# Patient Record
Sex: FEMALE | Race: BLACK OR AFRICAN AMERICAN | NOT HISPANIC OR LATINO | Employment: UNEMPLOYED | URBAN - METROPOLITAN AREA
[De-identification: names, ages, dates, MRNs, and addresses within clinical notes are randomized per-mention and may not be internally consistent; named-entity substitution may affect disease eponyms.]

---

## 2024-05-29 ENCOUNTER — HOSPITAL ENCOUNTER (OUTPATIENT)
Facility: HOSPITAL | Age: 64
Discharge: HOME | DRG: 419 | End: 2024-06-02
Attending: INTERNAL MEDICINE | Admitting: INTERNAL MEDICINE
Payer: COMMERCIAL

## 2024-05-29 DIAGNOSIS — K80.20 CALCULUS OF GALLBLADDER WITHOUT CHOLECYSTITIS WITHOUT OBSTRUCTION: ICD-10-CM

## 2024-05-29 DIAGNOSIS — R10.13 EPIGASTRIC PAIN: ICD-10-CM

## 2024-05-29 DIAGNOSIS — R74.01 TRANSAMINITIS: Primary | ICD-10-CM

## 2024-05-29 LAB
ALBUMIN SERPL-MCNC: 4.2 G/DL (ref 3.4–4.8)
ALBUMIN/GLOB SERPL: 1.1 RATIO (ref 1.1–2)
ALP SERPL-CCNC: 320 UNIT/L (ref 40–150)
ALT SERPL-CCNC: 1038 UNIT/L (ref 0–55)
ANION GAP SERPL CALC-SCNC: 13 MEQ/L
APAP SERPL-MCNC: <3 UG/ML (ref 10–30)
APTT PPP: 25.2 SECONDS (ref 23.2–33.7)
AST SERPL-CCNC: 1008 UNIT/L (ref 5–34)
BASOPHILS # BLD AUTO: 0.02 X10(3)/MCL
BASOPHILS NFR BLD AUTO: 0.2 %
BILIRUB SERPL-MCNC: 1.4 MG/DL
BUN SERPL-MCNC: 12.6 MG/DL (ref 9.8–20.1)
CALCIUM SERPL-MCNC: 9.9 MG/DL (ref 8.4–10.2)
CHLORIDE SERPL-SCNC: 105 MMOL/L (ref 98–107)
CO2 SERPL-SCNC: 20 MMOL/L (ref 23–31)
CREAT SERPL-MCNC: 0.78 MG/DL (ref 0.55–1.02)
CREAT/UREA NIT SERPL: 16
EOSINOPHIL # BLD AUTO: 0 X10(3)/MCL (ref 0–0.9)
EOSINOPHIL NFR BLD AUTO: 0 %
ERYTHROCYTE [DISTWIDTH] IN BLOOD BY AUTOMATED COUNT: 11.6 % (ref 11.5–17)
GFR SERPLBLD CREATININE-BSD FMLA CKD-EPI: >60 ML/MIN/1.73/M2
GLOBULIN SER-MCNC: 3.7 GM/DL (ref 2.4–3.5)
GLUCOSE SERPL-MCNC: 140 MG/DL (ref 82–115)
HCT VFR BLD AUTO: 39.7 % (ref 37–47)
HGB BLD-MCNC: 13.4 G/DL (ref 12–16)
IMM GRANULOCYTES # BLD AUTO: 0.04 X10(3)/MCL (ref 0–0.04)
IMM GRANULOCYTES NFR BLD AUTO: 0.4 %
INR PPP: 1
LIPASE SERPL-CCNC: 19 U/L
LYMPHOCYTES # BLD AUTO: 0.47 X10(3)/MCL (ref 0.6–4.6)
LYMPHOCYTES NFR BLD AUTO: 4.3 %
MCH RBC QN AUTO: 32.4 PG (ref 27–31)
MCHC RBC AUTO-ENTMCNC: 33.8 G/DL (ref 33–36)
MCV RBC AUTO: 95.9 FL (ref 80–94)
MONOCYTES # BLD AUTO: 0.38 X10(3)/MCL (ref 0.1–1.3)
MONOCYTES NFR BLD AUTO: 3.4 %
NEUTROPHILS # BLD AUTO: 10.14 X10(3)/MCL (ref 2.1–9.2)
NEUTROPHILS NFR BLD AUTO: 91.7 %
NRBC BLD AUTO-RTO: 0 %
OHS QRS DURATION: 84 MS
OHS QTC CALCULATION: 427 MS
PLATELET # BLD AUTO: 138 X10(3)/MCL (ref 130–400)
PLATELETS.RETICULATED NFR BLD AUTO: 7.4 % (ref 0.9–11.2)
PMV BLD AUTO: 12.3 FL (ref 7.4–10.4)
POTASSIUM SERPL-SCNC: 3.7 MMOL/L (ref 3.5–5.1)
PROT SERPL-MCNC: 7.9 GM/DL (ref 5.8–7.6)
PROTHROMBIN TIME: 12.8 SECONDS (ref 12.5–14.5)
RBC # BLD AUTO: 4.14 X10(6)/MCL (ref 4.2–5.4)
SODIUM SERPL-SCNC: 138 MMOL/L (ref 136–145)
WBC # SPEC AUTO: 11.05 X10(3)/MCL (ref 4.5–11.5)

## 2024-05-29 PROCEDURE — 25000003 PHARM REV CODE 250: Performed by: NURSE PRACTITIONER

## 2024-05-29 PROCEDURE — 96375 TX/PRO/DX INJ NEW DRUG ADDON: CPT

## 2024-05-29 PROCEDURE — 99285 EMERGENCY DEPT VISIT HI MDM: CPT | Mod: 25

## 2024-05-29 PROCEDURE — 96376 TX/PRO/DX INJ SAME DRUG ADON: CPT

## 2024-05-29 PROCEDURE — 93005 ELECTROCARDIOGRAM TRACING: CPT

## 2024-05-29 PROCEDURE — G0378 HOSPITAL OBSERVATION PER HR: HCPCS

## 2024-05-29 PROCEDURE — 63600175 PHARM REV CODE 636 W HCPCS

## 2024-05-29 PROCEDURE — 63600175 PHARM REV CODE 636 W HCPCS: Performed by: INTERNAL MEDICINE

## 2024-05-29 PROCEDURE — 85610 PROTHROMBIN TIME: CPT

## 2024-05-29 PROCEDURE — 96361 HYDRATE IV INFUSION ADD-ON: CPT

## 2024-05-29 PROCEDURE — 25000003 PHARM REV CODE 250

## 2024-05-29 PROCEDURE — 80143 DRUG ASSAY ACETAMINOPHEN: CPT

## 2024-05-29 PROCEDURE — 80053 COMPREHEN METABOLIC PANEL: CPT

## 2024-05-29 PROCEDURE — 96374 THER/PROPH/DIAG INJ IV PUSH: CPT

## 2024-05-29 PROCEDURE — 11000001 HC ACUTE MED/SURG PRIVATE ROOM

## 2024-05-29 PROCEDURE — 80074 ACUTE HEPATITIS PANEL: CPT

## 2024-05-29 PROCEDURE — 83690 ASSAY OF LIPASE: CPT

## 2024-05-29 PROCEDURE — 25500020 PHARM REV CODE 255

## 2024-05-29 PROCEDURE — 85730 THROMBOPLASTIN TIME PARTIAL: CPT

## 2024-05-29 PROCEDURE — 85025 COMPLETE CBC W/AUTO DIFF WBC: CPT

## 2024-05-29 RX ORDER — FAMOTIDINE 10 MG/ML
20 INJECTION INTRAVENOUS
Status: COMPLETED | OUTPATIENT
Start: 2024-05-29 | End: 2024-05-29

## 2024-05-29 RX ORDER — ONDANSETRON 4 MG/1
4 TABLET, ORALLY DISINTEGRATING ORAL
Status: COMPLETED | OUTPATIENT
Start: 2024-05-29 | End: 2024-05-29

## 2024-05-29 RX ORDER — POLYETHYLENE GLYCOL 3350 17 G/17G
17 POWDER, FOR SOLUTION ORAL
COMMUNITY

## 2024-05-29 RX ORDER — MORPHINE SULFATE 4 MG/ML
2 INJECTION, SOLUTION INTRAMUSCULAR; INTRAVENOUS EVERY 6 HOURS PRN
Status: DISCONTINUED | OUTPATIENT
Start: 2024-05-29 | End: 2024-06-02 | Stop reason: HOSPADM

## 2024-05-29 RX ORDER — HYDRALAZINE HYDROCHLORIDE 20 MG/ML
10 INJECTION INTRAMUSCULAR; INTRAVENOUS EVERY 6 HOURS PRN
Status: DISCONTINUED | OUTPATIENT
Start: 2024-05-29 | End: 2024-06-02 | Stop reason: HOSPADM

## 2024-05-29 RX ORDER — MORPHINE SULFATE 4 MG/ML
4 INJECTION, SOLUTION INTRAMUSCULAR; INTRAVENOUS
Status: COMPLETED | OUTPATIENT
Start: 2024-05-29 | End: 2024-05-29

## 2024-05-29 RX ORDER — ALPRAZOLAM 0.25 MG/1
0.25 TABLET ORAL 2 TIMES DAILY
COMMUNITY

## 2024-05-29 RX ORDER — ONDANSETRON HYDROCHLORIDE 2 MG/ML
4 INJECTION, SOLUTION INTRAVENOUS EVERY 8 HOURS PRN
Status: DISCONTINUED | OUTPATIENT
Start: 2024-05-29 | End: 2024-05-29

## 2024-05-29 RX ORDER — PROMETHAZINE HYDROCHLORIDE 25 MG/ML
25 INJECTION, SOLUTION INTRAMUSCULAR; INTRAVENOUS EVERY 6 HOURS PRN
Status: DISCONTINUED | OUTPATIENT
Start: 2024-05-29 | End: 2024-06-02 | Stop reason: HOSPADM

## 2024-05-29 RX ORDER — SODIUM CHLORIDE 9 MG/ML
INJECTION, SOLUTION INTRAVENOUS CONTINUOUS
Status: DISCONTINUED | OUTPATIENT
Start: 2024-05-29 | End: 2024-06-02

## 2024-05-29 RX ORDER — ONDANSETRON HYDROCHLORIDE 2 MG/ML
4 INJECTION, SOLUTION INTRAVENOUS EVERY 4 HOURS PRN
Status: DISCONTINUED | OUTPATIENT
Start: 2024-05-29 | End: 2024-06-02 | Stop reason: HOSPADM

## 2024-05-29 RX ADMIN — FAMOTIDINE 20 MG: 10 INJECTION, SOLUTION INTRAVENOUS at 03:05

## 2024-05-29 RX ADMIN — IOHEXOL 85 ML: 350 INJECTION, SOLUTION INTRAVENOUS at 01:05

## 2024-05-29 RX ADMIN — MORPHINE SULFATE 2 MG: 4 INJECTION INTRAVENOUS at 05:05

## 2024-05-29 RX ADMIN — MORPHINE SULFATE 4 MG: 4 INJECTION INTRAVENOUS at 01:05

## 2024-05-29 RX ADMIN — SODIUM CHLORIDE 1000 ML: 9 INJECTION, SOLUTION INTRAVENOUS at 01:05

## 2024-05-29 RX ADMIN — ONDANSETRON 4 MG: 4 TABLET, ORALLY DISINTEGRATING ORAL at 12:05

## 2024-05-29 RX ADMIN — SODIUM CHLORIDE: 9 INJECTION, SOLUTION INTRAVENOUS at 05:05

## 2024-05-29 NOTE — Clinical Note
Diagnosis: Epigastric pain [472357]   Future Attending Provider: LIAM VARGAS [[id number]]   Admit to which facility:: [facility] [[id number]]   Reason for IP Medical Treatment  (Clinical interventions that can only be accomplished in the IP setting? ) :: transaminitis eval and monitoring   I certify that Inpatient services for greater than or equal to 2 midnights are medically necessary:: Yes   Plans for Post-Acute care--if anticipated (pick the single best option):: A. No post acute care anticipated at this time   Special Needs:: No Special Needs [1]

## 2024-05-29 NOTE — ED PROVIDER NOTES
Encounter Date: 5/29/2024       History     Chief Complaint   Patient presents with    Abdominal Pain     Epigastric abdominal pain that started yesterday, denies any nausea/vomiting. Reports constipation with LBM this morning but not very much. Takes miralax as needed.      See [facility] for details     The history is provided by the patient and a friend.     Review of patient's allergies indicates:   Allergen Reactions    Bactrim [sulfamethoxazole-trimethoprim]     Milk containing products (dairy)      No past medical history on file.  No past surgical history on file.  No family history on file.     Review of Systems   Constitutional:  Positive for appetite change and fatigue. Negative for chills and fever.   Respiratory:  Negative for shortness of breath.    Cardiovascular:  Negative for chest pain.   Gastrointestinal:  Positive for abdominal pain, constipation, nausea and vomiting.   All other systems reviewed and are negative.      Physical Exam     Initial Vitals   BP Pulse Resp Temp SpO2   05/29/24 1131 05/29/24 1131 05/29/24 1131 05/29/24 1135 05/29/24 1131   (!) 156/67 70 18 97.7 °F (36.5 °C) 100 %      MAP       --                Physical Exam    Nursing note and vitals reviewed.  Constitutional: She appears well-developed and well-nourished. No distress.   HENT:   Head: Normocephalic and atraumatic.   Eyes: Conjunctivae and EOM are normal.   Neck:   Normal range of motion.  Cardiovascular:  Normal rate and regular rhythm.           Pulmonary/Chest: Breath sounds normal. No respiratory distress.   Abdominal: Abdomen is soft. She exhibits no distension.   Tender to palpation to epigastrium and right upper quadrant.  Pain is not out of proportion. There is no rebound and no guarding.   Musculoskeletal:         General: Normal range of motion.      Cervical back: Normal range of motion.     Neurological: She is alert and oriented to person, place, and time. GCS score is 15. GCS eye subscore is 4. GCS verbal  subscore is 5. GCS motor subscore is 6.   Skin: Skin is warm and dry.   Psychiatric: She has a normal mood and affect. Thought content normal.         ED Course   Procedures  Labs Reviewed   COMPREHENSIVE METABOLIC PANEL - Abnormal; Notable for the following components:       Result Value    CO2 20 (*)     Glucose 140 (*)     Protein Total 7.9 (*)     Globulin 3.7 (*)      (*)     ALT 1,038 (*)     AST 1,008 (*)     All other components within normal limits   CBC WITH DIFFERENTIAL - Abnormal; Notable for the following components:    RBC 4.14 (*)     MCV 95.9 (*)     MCH 32.4 (*)     MPV 12.3 (*)     Lymph # 0.47 (*)     Neut # 10.14 (*)     All other components within normal limits   ACETAMINOPHEN LEVEL - Abnormal; Notable for the following components:    Acetaminophen Level <3.0 (*)     All other components within normal limits   LIPASE - Normal   PROTIME-INR - Normal   APTT - Normal   CBC W/ AUTO DIFFERENTIAL    Narrative:     The following orders were created for panel order CBC W/ AUTO DIFFERENTIAL.  Procedure                               Abnormality         Status                     ---------                               -----------         ------                     CBC with Differential[[id number]]       Abnormal            Final result                 Please view results for these tests on the individual orders.   URINALYSIS, REFLEX TO URINE CULTURE   HEPATITIS PANEL, ACUTE   HEPATIC FUNCTION PANEL     EKG Readings: (Independently Interpreted)   Initial Reading: No STEMI. Rhythm: Normal Sinus Rhythm. Heart Rate: 70. Ectopy: No Ectopy. Conduction: Normal.   Normal sinus rhythm with no acute ST or T-wave abnormalities       Imaging Results              CT Abdomen Pelvis With IV Contrast NO Oral Contrast (Final result)  Result time 05/29/24 13:47:36      Final result by Krista Pal MD (05/29/24 13:47:36)                   Impression:      Distended gallbladder with numerous calcified  gallstones.  Right upper quadrant ultrasound is available for correlation.      Electronically signed by: Krista Pal  Date:    05/29/2024  Time:    13:47               Narrative:    EXAMINATION:  CT ABDOMEN PELVIS WITH IV CONTRAST    CLINICAL HISTORY:  transaminitis;    TECHNIQUE:  CT imaging was performed of the abdomen and pelvis after the administration of intravenous contrast. Dose length product is 188 mGycm. Automatic exposure control, adjustment of mA/kV or iterative reconstruction technique was used to limit radiation dose.    COMPARISON:  None    FINDINGS:  Liver: There are multiple scattered small hepatic hypodensities measuring up to 7 mm in size.    Gallbladder and biliary tree: Gallbladder is distended with numerous calcified gallstones.  No intra or extrahepatic biliary ductal dilation.    Pancreas: Normal.    Spleen: Normal.    Adrenals: Normal.    Kidneys and ureters: Bilateral renal cysts.  No hydronephrosis.    Bladder: Normal.    Reproductive organs: The uterus has been surgically resected.  A 2 cm right-sided Bartholin's gland cyst is noted.    Stomach/bowel: No evidence of bowel obstruction. Appendix is normal. No discernible bowel inflammation.    Lymph nodes: No pathologically enlarged lymph node identified.    Peritoneum: No ascites or free air. No fluid collection.    Vessels: Mild scattered vascular calcifications.    Abdominal wall: Normal.    Lung bases: No consolidation or pleural effusion.    Bones: No acute osseous findings.                                       Medications   famotidine (PF) injection 20 mg (has no administration in time range)   promethazine injection 25 mg (has no administration in time range)   ondansetron injection 4 mg (has no administration in time range)   ondansetron disintegrating tablet 4 mg (4 mg Oral Given 5/29/24 1243)   sodium chloride 0.9% bolus 1,000 mL 1,000 mL (0 mLs Intravenous Stopped 5/29/24 1431)   morphine injection 4 mg (4 mg Intravenous  Given 5/29/24 1331)   iohexoL (OMNIPAQUE 350) injection 85 mL (85 mLs Intravenous Given 5/29/24 1328)     Medical Decision Making  63-year-old female with no significant past medical history presents to the ER for evaluation of right upper quadrant pain x2 days.  She reports new onset nausea and vomiting today.  She reports 1 episode of vomiting while in the ER today.  Patient reports that she began to experience some right upper quadrant pain yesterday evening which is worsened today.  She localizes her pain to the right upper quadrant with radiation into her back.  She describes the pain as constant and varying in character and intensity.  She reports that she has been having some intermittent nausea since yesterday, however, today the nausea worsened.  She reports 1 episode of vomiting while in the waiting room.  She denies any hematemesis.  Patient denies any fever or chills.  She denies any recent sick contacts.  She does report that her last bowel movement was about 5 days ago.  She reports that she has been suffering with constipation with passing small pebble like stools.  She reports that she has been taking over-the-counter MiraLax with little improvement.  The patient denies any history of gallbladder or liver problems in the past.  She reports intermittent reflux over the years.      Lab work today reveals significantly elevated liver enzymes.  , ALT 1038, AST 1008.  Bilirubin was unremarkable.  No leukocytosis.  Coag studies were normal.  CT abdomen and pelvis was ordered for evaluation which showed a distended gallbladder with numerous calcified gallstones.  Hospital Medicine was consulted for admission for transaminitis.  GI was also consulted who saw the patient in the ER.  I discussed the patient with Dr. Hogan, emergency medicine we will also add face-to-face interaction with the patient.  In the ER, the patient was given Zofran, morphine, Pepcid and IV fluids.  She will be admitted to the  [facility].    Amount and/or Complexity of Data Reviewed  Labs: ordered. Decision-making details documented in ED Course.  Radiology: ordered. Decision-making details documented in ED Course.  Discussion of management or test interpretation with external provider(s): I did review the patient case and presentation with Belia, gastroenterology, who recommended admission with hospital medicine. Formal consult to GI was placed.     Spoke with [facility] medicine, who accepted admission for Dr. Chen.    Discussed the patient case and presentation with Dr. Hogan, emergency medicine who agreed with plan for admission and GI consult.  He also had face-to-face interaction with the patient.    Risk  Prescription drug management.  Decision regarding hospitalization.      Additional MDM:   Differential Diagnosis:   Other: The following diagnoses were also considered and will be evaluated: Cholecystitis, Pancreatitis and Gastroenteritis.            ED Course as of 05/29/24 1458   Wed May 29, 2024   1252 CBC grossly unremarkable [LM]   1252 ALP(!): 320 [LM]   1252 ALT(!): 1,038 [LM]   1252 AST(!): 1,008 [LM]   1328 Acetaminophen Level(!): <3.0 [LM]   1336 Coag studies normal [LM]   1350 CT Abd Pelvis: Impression:     Distended gallbladder with numerous calcified gallstones.  Right upper quadrant ultrasound is available for correlation. [LM]   1358 GI consulted  [LM]   1359 HM consulted [LM]      ED Course User Index  [LM] Izabel Interiano PA                           Clinical Impression:  Final diagnoses:  [R10.13] Epigastric pain  [R74.01] Transaminitis (Primary)  [K80.20] Calculus of gallbladder without cholecystitis without obstruction          ED Disposition Condition    Admit Stable                Izabel Interiano PA  05/29/24 1458

## 2024-05-29 NOTE — FIRST PROVIDER EVALUATION
Medical screening examination initiated.  I have conducted a focused provider triage encounter, findings are as follows:    Brief history of present illness:  arrived to ED due to epigastric pain and constipation. LBM today. Symptoms began on yesterday.     Vitals:    05/29/24 1131 05/29/24 1135   BP: (!) 156/67    Pulse: 70    Resp: 18    Temp:  97.7 °F (36.5 °C)   TempSrc:  Temporal   SpO2: 100%    Weight: 49.9 kg (110 lb)        Pertinent physical exam:  awake, alert, has non-labored breathing, in wheelchair    Brief workup plan:  labs, EKG     Preliminary workup initiated; this workup will be continued and followed by the physician or advanced practice provider that is assigned to the patient when roomed.

## 2024-05-29 NOTE — H&P
[facility]  Hospital Medicine History & Physical Examination       Patient Name: Yana Rand  MRN: [id number]  Patient Class: IP- Inpatient   Admission Date: 05/29/2024   Admitting Service: Hospital Medicine   Length of Stay: 0  Attending Physician: Wojciech Chen MD  Primary Care Provider: Niall Lambert MD  Face-to-Face encounter date: 05/29/2024  Code Status: Full  Chief Complaint: Abdominal Pain (Epigastric abdominal pain that started yesterday, denies any nausea/vomiting. Reports constipation with LBM this morning but not very much. Takes miralax as needed. )      Patient information was obtained from patient, patient's family, past medical records and ER records.    HISTORY OF PRESENT ILLNESS:   Yana Rand is a 63 y.o. female with a PMHx of anxiety who presented to [facility] on 5/29/2024 with c/o epigastric abdominal pain with associated constipation x 2 days.  She also endorsed nausea and vomiting.  She reportedly took MiraLax with some improvement with last BM on the morning of presentation..    Vital Signs upon presentation to the ED included /67, HR 70, RR 18, SpO2 100% on room air, temperature 97.7° F. Labs notable for CO2 20, glucose 140, , AST 1008, ALT 1038.  Acetaminophen level undetectable.  CT abdomen and pelvis with IV contrast demonstrates a distended gallbladder with numerous calcified gallstones.  GI consulted in ED. She received IV Zofran, IV morphine, Pepcid and IV fluids in the ED. Admitted to hospital medicine service for further medical management.    REVIEW OF SYSTEMS:   Except as documented, all other systems reviewed and negative.    PAST MEDICAL HISTORY:   Anxiety    PAST SURGICAL HISTORY:   Hysterectomy    FAMILY HISTORY:   Reviewed and negative    SOCIAL HISTORY:   Denied alcohol, tobacco or illicit drug use.     SCREENING FOR SOCIAL DRIVERS FOR HEALTH:   Patient screened for food insecurity, housing instability, transportation needs,  utility difficulties, and interpersonal safety (select all that apply as identified as concern):  []Housing or Food  []Transportation Needs  []Utility Difficulties  []Interpersonal safety  [x]None    ALLERGIES:   Bactrim [sulfamethoxazole-trimethoprim] and Milk containing products (dairy)    HOME MEDICATIONS:     Prior to Admission medications    Not on File     ________________________________________________________________________  INPATIENT LIST OF MEDICATIONS     Current Facility-Administered Medications:     ondansetron injection 4 mg, 4 mg, Intravenous, Q4H PRN, Izabel Kaba AGACNP-BC    promethazine injection 25 mg, 25 mg, Intramuscular, Q6H PRN, Izabel Kaba AGACNP-BC  No current outpatient medications on file.    Scheduled Meds:  Continuous Infusions:  PRN Meds:.  Current Facility-Administered Medications:     ondansetron, 4 mg, Intravenous, Q4H PRN    promethazine, 25 mg, Intramuscular, Q6H PRN    PHYSICAL EXAM:     VITAL SIGNS: 24 HRS MIN & MAX LAST   Temp  Min: 97.7 °F (36.5 °C)  Max: 97.7 °F (36.5 °C) 97.7 °F (36.5 °C)   BP  Min: 156/67  Max: 166/65 (!) 166/65   Pulse  Min: 70  Max: 78  78   Resp  Min: 18  Max: 22 20   SpO2  Min: 100 %  Max: 100 % 100 %       Physical exam per attending MD.    LABS AND IMAGING:     Recent Labs   Lab 05/29/24  1200   WBC 11.05   RBC 4.14*   HGB 13.4   HCT 39.7   MCV 95.9*   MCH 32.4*   MCHC 33.8   RDW 11.6      MPV 12.3*       Recent Labs   Lab 05/29/24  1200      K 3.7   CO2 20*   BUN 12.6   CREATININE 0.78   CALCIUM 9.9   ALBUMIN 4.2   ALKPHOS 320*   ALT 1,038*   AST 1,008*   BILITOT 1.4       Microbiology Results (last 7 days)       ** No results found for the last 168 hours. **             CT Abdomen Pelvis With IV Contrast NO Oral Contrast  Narrative: EXAMINATION:  CT ABDOMEN PELVIS WITH IV CONTRAST    CLINICAL HISTORY:  transaminitis;    TECHNIQUE:  CT imaging was performed of the abdomen and pelvis after the administration of intravenous  contrast. Dose length product is 188 mGycm. Automatic exposure control, adjustment of mA/kV or iterative reconstruction technique was used to limit radiation dose.    COMPARISON:  None    FINDINGS:  Liver: There are multiple scattered small hepatic hypodensities measuring up to 7 mm in size.    Gallbladder and biliary tree: Gallbladder is distended with numerous calcified gallstones.  No intra or extrahepatic biliary ductal dilation.    Pancreas: Normal.    Spleen: Normal.    Adrenals: Normal.    Kidneys and ureters: Bilateral renal cysts.  No hydronephrosis.    Bladder: Normal.    Reproductive organs: The uterus has been surgically resected.  A 2 cm right-sided Bartholin's gland cyst is noted.    Stomach/bowel: No evidence of bowel obstruction. Appendix is normal. No discernible bowel inflammation.    Lymph nodes: No pathologically enlarged lymph node identified.    Peritoneum: No ascites or free air. No fluid collection.    Vessels: Mild scattered vascular calcifications.    Abdominal wall: Normal.    Lung bases: No consolidation or pleural effusion.    Bones: No acute osseous findings.  Impression: Distended gallbladder with numerous calcified gallstones.  Right upper quadrant ultrasound is available for correlation.    Electronically signed by: Krista Pal  Date:    05/29/2024  Time:    13:47        ASSESSMENT & PLAN:   Transaminitis   Cholelithiasis  Epigastric, RUQ abdominal pain    History:  Anxiety    Plan:  GI consult, appreciate recommendations  Hepatitis panel pending   Abdominal ultrasound pending   NPO for now   NS at 75 ml/hr  Resume home medications as deemed appropriate once medication reconciliation is updated  Labs in AM    VTE Prophylaxis: SCDs    Discharge Planning and Disposition: [facility]    I, Izabel Kaba NP have reviewed and discussed the case with Wojciech Chen MD  Please see the attending MD's addendum for further assessment and plan.    JULIANA Harrison-BC  Department of  Hospital Medicine   [facility]   05/29/2024    This note was created with the assistance of [employer] Voice Recognition Software. There may be transcription errors as a result of using this technology, however minimal and effort has been made to assure accuracy of transcription, but any obvious errors or omissions should be clarified with the author of the document.    _______________________________________________________________________________  MD Addendum:      I Dr. Karie Chen performed substantive portion of the visit, personally performed a face to face evaluation of the patient and have reviewed and agree with NP/PA documentation, treatment and plan & MDM.     63-year-old woman, independent with ADLs and IADLs at baseline presented with acute onset epigastric, right upper quadrant abdominal pain for the past couple days associated with nausea, poor appetite.  Denies any nausea.  She had 1 episode of nonbilious vomiting while she was in the ER.  At presentation she was hemodynamically stable except for elevated blood pressure.  Labs revealed neutrophilia, normal INR, severely abnormal AST ALT with mildly elevated ALP.  CT abdomen pelvis revealed gallbladder distention, numerous calcified gallstones.  She was given a dose of morphine and admitted to hospital medicine service.  When seen at bedside she was alert, lethargic, resting in the bed.   was bedside.  She denies any illicit drug use, recent medications, diarrhea.  She does mention outside food before onset of symptoms.  Denies any GI malignancies in family.  Abdominal exam showed mild epigastric and right upper quadrant tenderness.  We will obtain ultrasound of abdomen for follow up, await further recommendations from GI, use analgesics as needed.  Follow up hepatitis panel.  Avoid further hepatotoxic medications.  We will consult surgery for cholecystectomy if necessary.  We will review and renew other  appropriate home medications.    05/29/2024

## 2024-05-29 NOTE — CONSULTS
Gastroenterology Consultation Note    Reason for Consult:  The primary encounter diagnosis was Transaminitis. Diagnoses of Epigastric pain and Calculus of gallbladder without cholecystitis without obstruction were also pertinent to this visit.    PCP:   Niall Lambert MD    Referring MD:  Self, Aaareferral  No address on file    Hospital Day: 0     Initial History of Present Illness (HPI):  This is a 63 y.o. female unknown to our GI practice presented to [facility] on 5/29/24 with sudden onset of epigastric pain that started 5/28/24.     She is Afebrile and VSS.   Labs are notable for significant transaminitis with normal bilirubin. CT did reflect cholelithiasis with multiple calcified gallstones. CBD not dilated per CT. Lipase normal  Hepatitis panel pending.  INR wnl    Patient is not hurting at this time but she is s/p morhphine.    Denies previous upper GI complaints.   Denies lower GI bowel changes. She does have a baseline of mild constipation- takes miralax. Last BM today   Denies epigastric, abdominal pains prior to yesterday. She states this started after a high fat meal.    Family history is significant for Twin Sister with Lap rio due to gallstones    Denies ETOH intake, high risk behaviors. Denies tobacco use.   No new medications/supplements. She has taken premarin and intermittent alprazolam for many years.      ROS:  Review of Systems   Constitutional:  Negative for chills, fever and weight loss.   HENT:  Negative for hearing loss.    Eyes:  Negative for blurred vision.   Respiratory:  Negative for cough, shortness of breath and wheezing.    Cardiovascular:  Negative for chest pain and leg swelling.   Gastrointestinal:  Positive for abdominal pain and constipation. Negative for blood in stool, diarrhea, heartburn, nausea and vomiting.   Genitourinary:  Negative for dysuria.   Musculoskeletal:  Negative for myalgias.   Skin:  Negative for rash.   Neurological:  Negative for dizziness, weakness and  headaches.   Psychiatric/Behavioral:  Negative for depression and memory loss. The patient is not nervous/anxious.        Medical History:   No past medical history on file.    Surgical History:   No past surgical history on file.    Family History:   No family history on file..     Social History:   Social History     Tobacco Use    Smoking status: Not on file    Smokeless tobacco: Not on file   Substance Use Topics    Alcohol use: Not on file       Allergies:  Review of patient's allergies indicates:   Allergen Reactions    Bactrim [sulfamethoxazole-trimethoprim]     Milk containing products (dairy)        (Not in a hospital admission)        Objective Findings:    Vital Signs:  BP (!) 166/65 (BP Location: Right arm, Patient Position: Sitting)   Pulse 78   Temp 97.7 °F (36.5 °C) (Temporal)   Resp 20   Wt 49.9 kg (110 lb)   SpO2 100%   There is no height or weight on file to calculate BMI.    Physical Exam:  Physical Exam  Constitutional:       General: She is not in acute distress.     Comments: thin   HENT:      Head: Normocephalic.      Mouth/Throat:      Pharynx: Oropharynx is clear.   Eyes:      General: No scleral icterus.     Pupils: Pupils are equal, round, and reactive to light.   Cardiovascular:      Rate and Rhythm: Normal rate and regular rhythm.      Heart sounds: Normal heart sounds. No murmur heard.  Pulmonary:      Effort: Pulmonary effort is normal. No respiratory distress.      Breath sounds: Normal breath sounds. No wheezing or rhonchi.   Abdominal:      General: Bowel sounds are normal. There is no distension.      Palpations: Abdomen is soft.      Tenderness: There is abdominal tenderness. There is no guarding.   Musculoskeletal:         General: No swelling.   Skin:     General: Skin is warm and dry.      Coloration: Skin is not jaundiced.   Neurological:      Mental Status: She is alert and oriented to person, place, and time.      Motor: No weakness.   Psychiatric:         Mood and  Affect: Mood normal.         Behavior: Behavior normal.         Labs:  Recent Results (from the past 48 hour(s))   Comp. Metabolic Panel    Collection Time: 05/29/24 12:00 PM   Result Value Ref Range    Sodium 138 136 - 145 mmol/L    Potassium 3.7 3.5 - 5.1 mmol/L    Chloride 105 98 - 107 mmol/L    CO2 20 (L) 23 - 31 mmol/L    Glucose 140 (H) 82 - 115 mg/dL    Blood Urea Nitrogen 12.6 9.8 - 20.1 mg/dL    Creatinine 0.78 0.55 - 1.02 mg/dL    Calcium 9.9 8.4 - 10.2 mg/dL    Protein Total 7.9 (H) 5.8 - 7.6 gm/dL    Albumin 4.2 3.4 - 4.8 g/dL    Globulin 3.7 (H) 2.4 - 3.5 gm/dL    Albumin/Globulin Ratio 1.1 1.1 - 2.0 ratio    Bilirubin Total 1.4 <=1.5 mg/dL     (H) 40 - 150 unit/L    ALT 1,038 (H) 0 - 55 unit/L    AST 1,008 (H) 5 - 34 unit/L    eGFR >60 mL/min/1.73/m2    Anion Gap 13.0 mEq/L    BUN/Creatinine Ratio 16    Lipase    Collection Time: 05/29/24 12:00 PM   Result Value Ref Range    Lipase Level 19 <=60 U/L   CBC with Differential    Collection Time: 05/29/24 12:00 PM   Result Value Ref Range    WBC 11.05 4.50 - 11.50 x10(3)/mcL    RBC 4.14 (L) 4.20 - 5.40 x10(6)/mcL    Hgb 13.4 12.0 - 16.0 g/dL    Hct 39.7 37.0 - 47.0 %    MCV 95.9 (H) 80.0 - 94.0 fL    MCH 32.4 (H) 27.0 - 31.0 pg    MCHC 33.8 33.0 - 36.0 g/dL    RDW 11.6 11.5 - 17.0 %    Platelet 138 130 - 400 x10(3)/mcL    MPV 12.3 (H) 7.4 - 10.4 fL    Neut % 91.7 %    Lymph % 4.3 %    Mono % 3.4 %    Eos % 0.0 %    Basophil % 0.2 %    Lymph # 0.47 (L) 0.6 - 4.6 x10(3)/mcL    Neut # 10.14 (H) 2.1 - 9.2 x10(3)/mcL    Mono # 0.38 0.1 - 1.3 x10(3)/mcL    Eos # 0.00 0 - 0.9 x10(3)/mcL    Baso # 0.02 <=0.2 x10(3)/mcL    IG# 0.04 0 - 0.04 x10(3)/mcL    IG% 0.4 %    NRBC% 0.0 %    IPF 7.4 0.9 - 11.2 %   Acetaminophen Level    Collection Time: 05/29/24 12:00 PM   Result Value Ref Range    Acetaminophen Level <3.0 (L) 10.0 - 30.0 ug/ml   Protime-INR    Collection Time: 05/29/24  1:05 PM   Result Value Ref Range    PT 12.8 12.5 - 14.5 seconds    INR 1.0 <=1.3    APTT    Collection Time: 05/29/24  1:05 PM   Result Value Ref Range    PTT 25.2 23.2 - 33.7 seconds       CT Abdomen Pelvis With IV Contrast NO Oral Contrast   Final Result      Distended gallbladder with numerous calcified gallstones.  Right upper quadrant ultrasound is available for correlation.         Electronically signed by: Krista Pal   Date:    05/29/2024   Time:    13:47      US Abdomen Limited_Liver    (Results Pending)       Imaging:    Abdominal U/S 5/29/24  in process at this time    CT ABDOMEN PELVIS WITH IV CONTRAST 5/29/24     CLINICAL HISTORY:  transaminitis;     TECHNIQUE:  CT imaging was performed of the abdomen and pelvis after the administration of intravenous contrast. Dose length product is 188 mGycm. Automatic exposure control, adjustment of mA/kV or iterative reconstruction technique was used to limit radiation dose.     COMPARISON:  None     FINDINGS:  Liver: There are multiple scattered small hepatic hypodensities measuring up to 7 mm in size.     Gallbladder and biliary tree: Gallbladder is distended with numerous calcified gallstones.  No intra or extrahepatic biliary ductal dilation.     Pancreas: Normal.     Spleen: Normal.     Adrenals: Normal.     Kidneys and ureters: Bilateral renal cysts.  No hydronephrosis.     Bladder: Normal.     Reproductive organs: The uterus has been surgically resected.  A 2 cm right-sided Bartholin's gland cyst is noted.     Stomach/bowel: No evidence of bowel obstruction. Appendix is normal. No discernible bowel inflammation.     Lymph nodes: No pathologically enlarged lymph node identified.     Peritoneum: No ascites or free air. No fluid collection.     Vessels: Mild scattered vascular calcifications.     Abdominal wall: Normal.     Lung bases: No consolidation or pleural effusion.     Bones: No acute osseous findings.     Impression:     Distended gallbladder with numerous calcified gallstones.  Right upper quadrant ultrasound is available for  correlation.        Electronically signed by:Krista Pal  Date:                                            05/29/2024  Time:                                           13:47    Endoscopy:    Patient reports a previous Normal Colonoscopy with Dr. Handy in [place]       Assessment/Plan:  Transaminitis  Cholelithiasis noted and suspected as the culprit with sudden onset of epigastric pain after fatty meal  Hepatitis panel pending  No high risk factors for sudden liver dysfunction  Trend labs in am  Cholelithiasis  Abdominal U/S pending for correlation  MRI machine is down today  NPO after midnight in case ERCP needed tomorrow    Thank you for allowing us to participate in the care of Yana Rand.    Clara Maurer NP acting as scribe for Dr. Khari Branch MD

## 2024-05-30 LAB
ALBUMIN SERPL-MCNC: 3.3 G/DL (ref 3.4–4.8)
ALBUMIN/GLOB SERPL: 1 RATIO (ref 1.1–2)
ALP SERPL-CCNC: 289 UNIT/L (ref 40–150)
ALT SERPL-CCNC: 873 UNIT/L (ref 0–55)
ANION GAP SERPL CALC-SCNC: 7 MEQ/L
AST SERPL-CCNC: 606 UNIT/L (ref 5–34)
BACTERIA #/AREA URNS AUTO: ABNORMAL /HPF
BASOPHILS # BLD AUTO: 0.03 X10(3)/MCL
BASOPHILS NFR BLD AUTO: 0.3 %
BILIRUB DIRECT SERPL-MCNC: 1.4 MG/DL (ref 0–?)
BILIRUB SERPL-MCNC: 2 MG/DL
BILIRUB UR QL STRIP.AUTO: ABNORMAL
BUN SERPL-MCNC: 12.4 MG/DL (ref 9.8–20.1)
CALCIUM SERPL-MCNC: 9 MG/DL (ref 8.4–10.2)
CHLORIDE SERPL-SCNC: 111 MMOL/L (ref 98–107)
CLARITY UR: CLEAR
CO2 SERPL-SCNC: 22 MMOL/L (ref 23–31)
COLOR UR AUTO: ABNORMAL
CREAT SERPL-MCNC: 0.69 MG/DL (ref 0.55–1.02)
CREAT/UREA NIT SERPL: 18
EOSINOPHIL # BLD AUTO: 0.03 X10(3)/MCL (ref 0–0.9)
EOSINOPHIL NFR BLD AUTO: 0.3 %
ERYTHROCYTE [DISTWIDTH] IN BLOOD BY AUTOMATED COUNT: 11.9 % (ref 11.5–17)
GFR SERPLBLD CREATININE-BSD FMLA CKD-EPI: >60 ML/MIN/1.73/M2
GLOBULIN SER-MCNC: 3.2 GM/DL (ref 2.4–3.5)
GLUCOSE SERPL-MCNC: 96 MG/DL (ref 82–115)
GLUCOSE UR QL STRIP: ABNORMAL
HAV IGM SERPL QL IA: NONREACTIVE
HBV CORE IGM SERPL QL IA: NONREACTIVE
HBV SURFACE AG SERPL QL IA: NONREACTIVE
HCT VFR BLD AUTO: 36.9 % (ref 37–47)
HCV AB SERPL QL IA: NONREACTIVE
HGB BLD-MCNC: 11.9 G/DL (ref 12–16)
HGB UR QL STRIP: ABNORMAL
IMM GRANULOCYTES # BLD AUTO: 0.03 X10(3)/MCL (ref 0–0.04)
IMM GRANULOCYTES NFR BLD AUTO: 0.3 %
INR PPP: 1.1
KETONES UR QL STRIP: NEGATIVE
LEUKOCYTE ESTERASE UR QL STRIP: NEGATIVE
LYMPHOCYTES # BLD AUTO: 1.06 X10(3)/MCL (ref 0.6–4.6)
LYMPHOCYTES NFR BLD AUTO: 10.8 %
M2 QUANT (OHS): 0.9 U/ML
MCH RBC QN AUTO: 31.6 PG (ref 27–31)
MCHC RBC AUTO-ENTMCNC: 32.2 G/DL (ref 33–36)
MCV RBC AUTO: 97.9 FL (ref 80–94)
MONOCYTES # BLD AUTO: 1.2 X10(3)/MCL (ref 0.1–1.3)
MONOCYTES NFR BLD AUTO: 12.3 %
MUCOUS THREADS URNS QL MICRO: ABNORMAL /LPF
NEUTROPHILS # BLD AUTO: 7.44 X10(3)/MCL (ref 2.1–9.2)
NEUTROPHILS NFR BLD AUTO: 76 %
NITRITE UR QL STRIP: NEGATIVE
NRBC BLD AUTO-RTO: 0 %
PH UR STRIP: 5.5 [PH]
PLATELET # BLD AUTO: 117 X10(3)/MCL (ref 130–400)
PMV BLD AUTO: 12.2 FL (ref 7.4–10.4)
POTASSIUM SERPL-SCNC: 3.5 MMOL/L (ref 3.5–5.1)
PROT SERPL-MCNC: 6.5 GM/DL (ref 5.8–7.6)
PROT UR QL STRIP: ABNORMAL
PROTHROMBIN TIME: 13.7 SECONDS (ref 12.5–14.5)
RBC # BLD AUTO: 3.77 X10(6)/MCL (ref 4.2–5.4)
RBC #/AREA URNS AUTO: ABNORMAL /HPF
SODIUM SERPL-SCNC: 140 MMOL/L (ref 136–145)
SP GR UR STRIP.AUTO: 1.02 (ref 1–1.03)
SQUAMOUS #/AREA URNS LPF: ABNORMAL /HPF
UROBILINOGEN UR STRIP-ACNC: 4
WBC # SPEC AUTO: 9.79 X10(3)/MCL (ref 4.5–11.5)
WBC #/AREA URNS AUTO: ABNORMAL /HPF

## 2024-05-30 PROCEDURE — 80053 COMPREHEN METABOLIC PANEL: CPT | Performed by: NURSE PRACTITIONER

## 2024-05-30 PROCEDURE — 81001 URINALYSIS AUTO W/SCOPE: CPT

## 2024-05-30 PROCEDURE — 86015 ACTIN ANTIBODY EACH: CPT | Performed by: INTERNAL MEDICINE

## 2024-05-30 PROCEDURE — 83516 IMMUNOASSAY NONANTIBODY: CPT | Performed by: INTERNAL MEDICINE

## 2024-05-30 PROCEDURE — 86039 ANTINUCLEAR ANTIBODIES (ANA): CPT | Performed by: INTERNAL MEDICINE

## 2024-05-30 PROCEDURE — G0378 HOSPITAL OBSERVATION PER HR: HCPCS

## 2024-05-30 PROCEDURE — 25000003 PHARM REV CODE 250: Performed by: NURSE PRACTITIONER

## 2024-05-30 PROCEDURE — 85610 PROTHROMBIN TIME: CPT | Performed by: INTERNAL MEDICINE

## 2024-05-30 PROCEDURE — 82248 BILIRUBIN DIRECT: CPT | Performed by: INTERNAL MEDICINE

## 2024-05-30 PROCEDURE — 11000001 HC ACUTE MED/SURG PRIVATE ROOM

## 2024-05-30 PROCEDURE — 85025 COMPLETE CBC W/AUTO DIFF WBC: CPT | Performed by: NURSE PRACTITIONER

## 2024-05-30 PROCEDURE — 36415 COLL VENOUS BLD VENIPUNCTURE: CPT | Performed by: INTERNAL MEDICINE

## 2024-05-30 RX ORDER — POLYETHYLENE GLYCOL 3350 17 G/17G
17 POWDER, FOR SOLUTION ORAL DAILY
Status: DISCONTINUED | OUTPATIENT
Start: 2024-05-30 | End: 2024-06-02 | Stop reason: HOSPADM

## 2024-05-30 RX ADMIN — POLYETHYLENE GLYCOL 3350 17 G: 17 POWDER, FOR SOLUTION ORAL at 05:05

## 2024-05-30 NOTE — PROGRESS NOTES
Inpatient Nutrition Evaluation    Admit Date: 5/29/2024   Total duration of encounter: 1 day   Patient Age: 63 y.o.    Nutrition Recommendation/Prescription     Continue Clear liquids as tolerated.   Recommended a Low Fat diet when diet is advanced for better tolerance    Nutrition Assessment     Chart Review    Reason Seen: continuous nutrition monitoring    Malnutrition Screening Tool Results   Have you recently lost weight without trying?: Unsure  Have you been eating poorly because of a decreased appetite?: Yes   MST Score: 3   Diagnosis:  Transaminitis  Cholelithiasis noted and suspected as the culprit with sudden onset of epigastric pain after fatty meal  Hepatitis panel, AMA, ROBERTA, Sm Ab pending  No high risk factors for sudden liver dysfunction  Trend labs- LFTs improving  Cholelithiasis  Abdominal U/S with CBD 8 mm  MRI machine is down today with plans to be fixed this afternoon.   Clear liquids until MRI machine repaired.    Relevant Medical History: anxiety    Scheduled Medications:  polyethylene glycol, 17 g, Daily    Continuous Infusions:  sodium chloride 0.9%, Last Rate: 75 mL/hr at 05/29/24 1757    PRN Medications:   Current Facility-Administered Medications:     hydrALAZINE, 10 mg, Intravenous, Q6H PRN    morphine, 2 mg, Intravenous, Q6H PRN    ondansetron, 4 mg, Intravenous, Q4H PRN    promethazine, 25 mg, Intramuscular, Q6H PRN    Recent Labs   Lab 05/29/24  1200 05/30/24  0433    140   K 3.7 3.5   CALCIUM 9.9 9.0   CHLORIDE 105 111*   CO2 20* 22*   BUN 12.6 12.4   CREATININE 0.78 0.69   EGFRNORACEVR >60 >60   GLUCOSE 140* 96   BILITOT 1.4 2.0*   ALKPHOS 320* 289*   ALT 1,038* 873*   AST 1,008* 606*   ALBUMIN 4.2 3.3*   LIPASE 19  --    WBC 11.05 9.79   HGB 13.4 11.9*   HCT 39.7 36.9*     Nutrition Orders:  Diet Clear Liquid      Appetite/Oral Intake: fair/25-50% of meals  Factors Affecting Nutritional Intake: abdominal pain, nausea, and vomiting  Food/Church/Cultural Preferences: none  "reported  Food Allergies: milk-dairy  Last Bowel Movement: 05/28/24  Wound(s):  intact    Comments    5/30/24: Pt tolerating liquids well today. Abd pain improved. No vomiting noted.     Anthropometrics    Height: 5' 4" (162.6 cm),    Last Weight: 49.9 kg (110 lb 0.2 oz) (05/30/24 0212), Weight Method: Stated  BMI (Calculated): 18.9  BMI Classification: normal (BMI 18.5-24.9)     Ideal Body Weight (IBW), Female: 120 lb     % Ideal Body Weight, Female (lb): 91.68 %                             Usual Weight Provided By: patient and EMR weight history    Wt Readings from Last 5 Encounters:   05/30/24 49.9 kg (110 lb 0.2 oz)     Weight Change(s) Since Admission: .  Wt Readings from Last 1 Encounters:   05/30/24 0212 49.9 kg (110 lb 0.2 oz)   05/29/24 1630 49.9 kg (110 lb 0.2 oz)   05/29/24 1131 49.9 kg (110 lb)   Admit Weight: 49.9 kg (110 lb) (05/29/24 1131), Weight Method: Stated    Patient Education     Not applicable.    Nutrition Goals & Monitoring     Dietitian will monitor: food and beverage intake    Nutrition Risk/Follow-Up: low (follow-up in 5-7 days)  Patients assigned 'low nutrition risk' status do not qualify for a full nutritional assessment but will be monitored and re-evaluated in a 5-7 day time period. Please consult if re-evaluation needed sooner.   "

## 2024-05-30 NOTE — PROGRESS NOTES
[facility]  Hospital Medicine Progress Note        Chief Complaint: Inpatient Follow-up for elevated LFTs     HPI:   Yana Rand is a 63 y.o. female with a PMHx of anxiety who presented to [facility] on 5/29/2024 with c/o epigastric abdominal pain with associated constipation x 2 days.  She also endorsed nausea and vomiting.  She reportedly took MiraLax with some improvement with last BM on the morning of presentation..     Vital Signs upon presentation to the ED included /67, HR 70, RR 18, SpO2 100% on room air, temperature 97.7° F. Labs notable for CO2 20, glucose 140, , AST 1008, ALT 1038.  Acetaminophen level undetectable.  CT abdomen and pelvis with IV contrast demonstrates a distended gallbladder with numerous calcified gallstones.  GI consulted in ED. She received IV Zofran, IV morphine, Pepcid and IV fluids in the ED. Admitted to hospital medicine service for further medical management.    Seen by GI team and MRCP ordered.   Interval Hx:   Patient today awake and comfortable. Denies any abdominal pain, fever, chills, nausea or vomiting.    Family at bedside, explained in detail about the patients condition, diagnosis, vitals, labs and treatment plan. They understand and agree with the plan. All their questions were answered.      Case was discussed with patient's nurse and  on the floor.    Objective/physical exam:  General: In no acute distress  Chest: Clear to auscultation bilaterally  Heart: RRR, +S1, S2, no appreciable murmur  Abdomen: Soft, nontender, BS +  Neurologic: Alert and oriented x4, Cranial nerve II-XII intact, Strength 5/5 in all 4 extremities    VITAL SIGNS: 24 HRS MIN & MAX LAST   Temp  Min: 97.7 °F (36.5 °C)  Max: 98.8 °F (37.1 °C) 97.9 °F (36.6 °C)   BP  Min: 136/67  Max: 166/65 139/68   Pulse  Min: 69  Max: 89  69   Resp  Min: 17  Max: 22 18   SpO2  Min: 97 %  Max: 100 % 97 %     I have reviewed the following labs:  Recent Labs   Lab  05/29/24  1200 05/30/24  0433   WBC 11.05 9.79   RBC 4.14* 3.77*   HGB 13.4 11.9*   HCT 39.7 36.9*   MCV 95.9* 97.9*   MCH 32.4* 31.6*   MCHC 33.8 32.2*   RDW 11.6 11.9    117*   MPV 12.3* 12.2*     Recent Labs   Lab 05/29/24  1200 05/30/24  0433    140   K 3.7 3.5   CO2 20* 22*   BUN 12.6 12.4   CREATININE 0.78 0.69   CALCIUM 9.9 9.0   ALBUMIN 4.2 3.3*   ALKPHOS 320* 289*   ALT 1,038* 873*   AST 1,008* 606*   BILITOT 1.4 2.0*     Microbiology Results (last 7 days)       ** No results found for the last 168 hours. **             See below for Radiology    Scheduled Med:   polyethylene glycol  17 g Oral Daily      Continuous Infusions:   sodium chloride 0.9%   Intravenous Continuous 75 mL/hr at 05/29/24 1757 New Bag at 05/29/24 1757      PRN Meds:    Current Facility-Administered Medications:     hydrALAZINE, 10 mg, Intravenous, Q6H PRN    morphine, 2 mg, Intravenous, Q6H PRN    ondansetron, 4 mg, Intravenous, Q4H PRN    promethazine, 25 mg, Intramuscular, Q6H PRN     Assessment/Plan:  Epigastric, RUQ abdominal pain with Elevated LFTs   Cholelithiasis     History:  Anxiety    Plan:  Patient looks comfortable. Has been afebrile  Abdominal exam today is  unremarkable   Will continue iv fluids, clear liquids  LFTs trending down today , , T bili 2    MRCP pending     GI team following patient ? ERCP   Now on clear liquid     Labs in am     Continue supportive care        VTE prophylaxis: SCD    Patient condition:  Fair    Anticipated discharge and Disposition:   Home       All diagnosis and differential diagnosis have been reviewed; assessment and plan has been documented; I have personally reviewed the labs and test results that are presently available; I have reviewed the patients medication list; I have reviewed the consulting providers response and recommendations. I have reviewed or attempted to review medical records based upon their availability    All of the patient's questions have  been  addressed and answered. Patient's is agreeable to the above stated plan. I will continue to monitor closely and make adjustments to medical management as needed.  _____________________________________________________________________    Nutrition Status:    Radiology:  I have personally reviewed the following imaging and agree with the radiologist.     US Abdomen Limited_Liver  Narrative: EXAMINATION:  US ABDOMEN LIMITED_LIVER    CLINICAL HISTORY:  RUQ pain;    COMPARISON:  CT earlier today.    FINDINGS:  Grayscale, color and spectral doppler evaluation of the right upper quadrant.    No focal abnormality of limited visualized pancreas. Imaged portion of the IVC is normal in caliber.    Liver is not significantly enlarged. No focal liver lesion. Normal hepatopetal flow is noted in the portal vein.    There are gallstones.  The gallbladder is distended and there is trace pericholecystic fluid.  Sonographic Rose sign was negative.  The common bile duct is mildly dilated measuring up to 8 mm.    Right kidney measures 9 cm in length. No hydronephrosis.  Impression: 1. Cholelithiasis with distended gallbladder but negative sonographic Rose sign.  If there remains clinical suspicion of acute cholecystitis would recommend correlation with nuclear medicine HIDA scan.  2. Mild extrahepatic biliary ductal dilatation.    Electronically signed by: David Guzmán  Date:    05/29/2024  Time:    16:22  CT Abdomen Pelvis With IV Contrast NO Oral Contrast  Narrative: EXAMINATION:  CT ABDOMEN PELVIS WITH IV CONTRAST    CLINICAL HISTORY:  transaminitis;    TECHNIQUE:  CT imaging was performed of the abdomen and pelvis after the administration of intravenous contrast. Dose length product is 188 mGycm. Automatic exposure control, adjustment of mA/kV or iterative reconstruction technique was used to limit radiation dose.    COMPARISON:  None    FINDINGS:  Liver: There are multiple scattered small hepatic hypodensities measuring up  to 7 mm in size.    Gallbladder and biliary tree: Gallbladder is distended with numerous calcified gallstones.  No intra or extrahepatic biliary ductal dilation.    Pancreas: Normal.    Spleen: Normal.    Adrenals: Normal.    Kidneys and ureters: Bilateral renal cysts.  No hydronephrosis.    Bladder: Normal.    Reproductive organs: The uterus has been surgically resected.  A 2 cm right-sided Bartholin's gland cyst is noted.    Stomach/bowel: No evidence of bowel obstruction. Appendix is normal. No discernible bowel inflammation.    Lymph nodes: No pathologically enlarged lymph node identified.    Peritoneum: No ascites or free air. No fluid collection.    Vessels: Mild scattered vascular calcifications.    Abdominal wall: Normal.    Lung bases: No consolidation or pleural effusion.    Bones: No acute osseous findings.  Impression: Distended gallbladder with numerous calcified gallstones.  Right upper quadrant ultrasound is available for correlation.    Electronically signed by: Krista Pal  Date:    05/29/2024  Time:    13:47      Catracho Jin MD  Department of Hospital Medicine   [facility]   05/30/2024

## 2024-05-30 NOTE — NURSING
Nurses Note -- 4 Eyes      5/29/2024   11:31 PM      Skin assessed during: Admit      [x] No Altered Skin Integrity Present    [x]Prevention Measures Documented      [] Yes- Altered Skin Integrity Present or Discovered   [] LDA Added if Not in Epic (Describe Wound)   [] New Altered Skin Integrity was Present on Admit and Documented in LDA   [] Wound Image Taken    Wound Care Consulted? No    Attending Nurse:  Layo Durant RN/Staff Member:  SILVESTRE Loo

## 2024-05-30 NOTE — CONSULTS
[facility]   Consult Note  Trauma and Acute Care Surgery    Patient Name: Yana Rand  YOB: 1960  Date of Admission: 5/29/2024  Date: 05/30/2024 12:25 AM  Consulting Service: ED  Reason for Consult: RUQ with concern for acute cholecystitis    HD#1  POD#* No surgery found *    PRESENTING HISTORY     Chief Complaint/Reason for Admission: <principal problem not specified>    History of Present Illness:  65 yo female without significant PMHx p/w a 3-day hx of RUQ pain and nausea. her pain started the morning after she ate a heavy meal on memorial day. she has never experienced similar pain in the past. initially thought it was due to acid reflux, but the pain continued to worsen to the point where she had difficulty walking. associated nausea and 1 episode of emesis today. radiologic workup remarkable for a large, distended gallbladder with mild  CBD dilation. AF and no leukocytosis but elevated LFTs with borderline Tbili. surgery consulted for further evaluation    Review of Systems:  12 point ROS negative except as stated in HPI    PAST HISTORY:   Past medical history:  No past medical history on file.  No past medical history on file.    Past surgical history:  No past surgical history on file.  No past surgical history on file.    Family history:  No family history on file.    Social history:  Social History     Socioeconomic History    Marital status:      Social History     Tobacco Use   Smoking Status Not on file   Smokeless Tobacco Not on file         MEDICATIONS & ALLERGIES:   Allergies:   Review of patient's allergies indicates:   Allergen Reactions    Bactrim [sulfamethoxazole-trimethoprim]     Milk containing products (dairy)        No current facility-administered medications on file prior to encounter.     Current Outpatient Medications on File Prior to Encounter   Medication Sig    ALPRAZolam (XANAX) 0.25 MG tablet Take 0.25 mg by mouth 2 (two) times a day.    estrogens,  "conjugated, (PREMARIN) 0.625 MG tablet Take 0.625 mg by mouth once daily.    polyethylene glycol (GLYCOLAX) 17 gram/dose powder Take 17 g by mouth as needed for Constipation.       Scheduled Meds:    Continuous Infusions:   sodium chloride 0.9%   Intravenous Continuous 75 mL/hr at 05/29/24 1757 New Bag at 05/29/24 1757       PRN Meds:    Current Facility-Administered Medications:     hydrALAZINE, 10 mg, Intravenous, Q6H PRN    morphine, 2 mg, Intravenous, Q6H PRN    ondansetron, 4 mg, Intravenous, Q4H PRN    promethazine, 25 mg, Intramuscular, Q6H PRN    OBJECTIVE:     Vital Signs:  Temp:  [97.7 °F (36.5 °C)-98.8 °F (37.1 °C)] 98.1 °F (36.7 °C)  Pulse:  [70-89] 81  Resp:  [17-22] 18  SpO2:  [97 %-100 %] 100 %  BP: (136-166)/(65-72) 152/71  Body mass index is 18.88 kg/m².     No intake/output data recorded.  No intake/output data recorded.    Physical Exam:  General:  Well developed, well nourished, no acute distressHEENT:  Normocephalic, atraumatic, PERRL, EOMI, clear sclera, ears normal, neck supple, throat clear without erythema or exudatesCVS:  RRRResp:  Normal work of breathing on RA, equal rise and fall of the chestGI: Abdomen soft, mild RUQ TTP, non-distended, no masses, no reboundGU:  DeferredMSK:  No muscle atrophy, cyanosis, peripheral edema, moving all extremities spontaneouslyVascular: *2+ radial pulses bilaterally*. All extremities WWPSkin:  No rashes, ulcers, erythemaNeuro:  CNII-XII grossly intact, alert and oriented to person, place, and time    Laboratory:  Recent Labs     05/29/24  1200 05/29/24  1305   WBC 11.05  --    HGB 13.4  --    HCT 39.7  --      --    PTT  --  25.2   INR  --  1.0     Recent Labs     05/29/24  1200      K 3.7   CO2 20*   BUN 12.6   CREATININE 0.78   CALCIUM 9.9   ALBUMIN 4.2   BILITOT 1.4   AST 1,008*   ALKPHOS 320*   ALT 1,038*     Troponin:  No results for input(s): "TROPONINI" in the last 72 hours.  CBC:  Recent Labs     05/29/24  1200   WBC 11.05   RBC 4.14* " "  HGB 13.4   HCT 39.7      MCV 95.9*   MCH 32.4*   MCHC 33.8     CMP:  Recent Labs     05/29/24  1200   CALCIUM 9.9   ALBUMIN 4.2      K 3.7   CO2 20*   BUN 12.6   CREATININE 0.78   ALKPHOS 320*   ALT 1,038*   AST 1,008*   BILITOT 1.4     Lactic Acid:  No results for input(s): "LACTATE" in the last 72 hours.  Etoh:  No results for input(s): "ALCOHOLMEDIC" in the last 72 hours.  Drug Screen:  No results for input(s): "PCDSCOMETHA", "COCAINEMETAB", "OPIATESCREEN", "BARBITURATES", "AMPHETAMINES", "MARIJUANATHC", "PCDSOPHENCYN", "CREATRANDUR", "TOXINFO" in the last 72 hours.    ABG:  No results for input(s): "PH", "PCO2", "PO2", "HCO3", "BE", "POCSATURATED" in the last 72 hours.    Diagnostic Results:  US Abdomen Limited_Liver    Result Date: 5/29/2024  1. Cholelithiasis with distended gallbladder but negative sonographic Rose sign.  If there remains clinical suspicion of acute cholecystitis would recommend correlation with nuclear medicine HIDA scan. 2. Mild extrahepatic biliary ductal dilatation. Electronically signed by: David Guzmán Date:    05/29/2024 Time:    16:22    CT Abdomen Pelvis With IV Contrast NO Oral Contrast    Result Date: 5/29/2024  Distended gallbladder with numerous calcified gallstones.  Right upper quadrant ultrasound is available for correlation. Electronically signed by: Krista Pal Date:    05/29/2024 Time:    13:47    US Abdomen Limited_Liver   Final Result      1. Cholelithiasis with distended gallbladder but negative sonographic Rose sign.  If there remains clinical suspicion of acute cholecystitis would recommend correlation with nuclear medicine HIDA scan.   2. Mild extrahepatic biliary ductal dilatation.         Electronically signed by: David Guzmán   Date:    05/29/2024   Time:    16:22      CT Abdomen Pelvis With IV Contrast NO Oral Contrast   Final Result      Distended gallbladder with numerous calcified gallstones.  Right upper quadrant ultrasound is available " for correlation.         Electronically signed by: Krista Pal   Date:    05/29/2024   Time:    13:47          Microbiology:  Microbiology Results (last 7 days)       ** No results found for the last 168 hours. **             ASSESSMENT & PLAN:   65 y/o F without significant PMHx presenting with a 3 day hx of RUQ abdominal pain and nausea with radiologic findings of cholelithiasis and with elevated LFTs. Clinical presentation most consistent with cholelithiasis in the setting of possible recent choledocholithiasis. Viral hepatitis also on the differential.      - Recommend admission to medicine with GI consult for further workup and ERCP to rule out choledocholithiasis  - General Surgery will follow for interval cholecystectomy if indicated  - NPO, mIVF  - Remainder of care per primary team    evin Mcdermott MD  [employer] General Surgery, PGY2     Plan:    Evin Mcdermott MD  Trauma/Acute Care Surgery  5/30/2024  12:25 AM

## 2024-05-30 NOTE — PLAN OF CARE
Problem: Adult Inpatient Plan of Care  Goal: Plan of Care Review  Outcome: Progressing  Goal: Patient-Specific Goal (Individualized)  Outcome: Progressing  Goal: Absence of Hospital-Acquired Illness or Injury  Outcome: Progressing  Goal: Optimal Comfort and Wellbeing  Outcome: Progressing  Goal: Readiness for Transition of Care  Outcome: Progressing     Problem: Pain Acute  Goal: Optimal Pain Control and Function  Outcome: Progressing     Problem: Nausea and Vomiting  Goal: Nausea and Vomiting Relief  Outcome: Progressing

## 2024-05-30 NOTE — PLAN OF CARE
05/30/24 1516   Discharge Assessment   Assessment Type Discharge Planning Assessment   Confirmed/corrected address, phone number and insurance Yes   Confirmed Demographics Correct on Facesheet   Source of Information patient;family   Communicated JENSEN with patient/caregiver Date not available/Unable to determine   Reason For Admission transaminitis   People in Home spouse   Do you expect to return to your current living situation? Yes   Do you have help at home or someone to help you manage your care at home? Yes   Who are your caregiver(s) and their phone number(s)? spouse Chemo   Prior to hospitilization cognitive status: Alert/Oriented   Current cognitive status: Alert/Oriented   Walking or Climbing Stairs Difficulty no   Dressing/Bathing Difficulty no   Home Accessibility wheelchair accessible   Home Layout Able to live on 1st floor   Equipment Currently Used at Home none   Readmission within 30 days? No   Patient currently being followed by outpatient case management? No   Do you currently have service(s) that help you manage your care at home? No   Do you take prescription medications? Yes   Do you have prescription coverage? Yes   Coverage [employer]   Do you have any problems affording any of your prescribed medications? No   Is the patient taking medications as prescribed? yes   Who is going to help you get home at discharge? spouse Chemo   How do you get to doctors appointments? family or friend will provide   Are you on dialysis? No   Do you take coumadin? No   Discharge Plan A Home   Discharge Plan B Home with family   DME Needed Upon Discharge  none   Discharge Plan discussed with: Patient;Spouse/sig other   Name(s) and Number(s) Chemo Rand   Transition of Care Barriers None

## 2024-05-30 NOTE — PROGRESS NOTES
"   Acute Care Surgery   Progress Note  Admit Date: 5/29/2024  HD#1  POD#* No surgery found *    Subjective:   Interval history:  No acute events overnight. Afebrile. Hemodynamically stable.   States pain is notably improved from last night, only having some mild discomfort RUQ.   NPO with mIVF.  No flatus, no BM  Denies fever, chills, chest pain, SOB, nausea, vomiting.     Home Meds:  Current Outpatient Medications   Medication Instructions    ALPRAZolam (XANAX) 0.25 mg, Oral, 2 times daily    estrogens (conjugated) (PREMARIN) 0.625 mg, Oral, Daily    polyethylene glycol (GLYCOLAX) 17 g, Oral, As needed (PRN)      Scheduled Meds:  Continuous Infusions:   sodium chloride 0.9%   Intravenous Continuous 75 mL/hr at 05/29/24 1757 New Bag at 05/29/24 1757     PRN Meds:  Current Facility-Administered Medications:     hydrALAZINE, 10 mg, Intravenous, Q6H PRN    morphine, 2 mg, Intravenous, Q6H PRN    ondansetron, 4 mg, Intravenous, Q4H PRN    promethazine, 25 mg, Intramuscular, Q6H PRN     Objective:     VITAL SIGNS: 24 HR MIN & MAX LAST   Temp  Min: 97.7 °F (36.5 °C)  Max: 98.8 °F (37.1 °C)  98.5 °F (36.9 °C)   BP  Min: 136/67  Max: 166/65  (!) 155/67    Pulse  Min: 70  Max: 89  79    Resp  Min: 17  Max: 22  18    SpO2  Min: 97 %  Max: 100 %  98 %      HT: 5' 4" (162.6 cm)  WT: 49.9 kg (110 lb 0.2 oz)  BMI: 18.9     Intake/output:  Intake/Output - Last 3 Shifts         05/28 0700 05/29 0659 05/29 0700 05/30 0659 05/30 0700 05/31 0659           Urine Occurrence  2 x     Stool Occurrence  0 x           No intake or output data in the 24 hours ending 05/30/24 0701      Lines/drains/airway:       Peripheral IV - Single Lumen 05/29/24 1305 20 G Left Antecubital (Active)   Site Assessment Clean;Dry;Intact;No redness;No swelling 05/30/24 0600   Extremity Assessment Distal to IV No abnormal discoloration;No redness;No swelling 05/29/24 1305   Line Status Infusing 05/30/24 0600   Dressing Status Clean;Dry;Intact 05/30/24 0600 " "  Dressing Intervention Integrity maintained 05/30/24 0600   Number of days: 0     Physical examination:  Gen: NAD, AAOx3, answering questions appropriately  HEENT: normocephalic, atraumatic  CV: RR  Resp: NWOB  Abd: Soft, minimal TTP RUQ, non-distended. No guarding or rebound.   Ext: moving all extremities spontaneously and purposefully  Neuro: CN II-XII grossly intact    Labs:  Renal:  Recent Labs     05/29/24  1200 05/30/24  0433   BUN 12.6 12.4   CREATININE 0.78 0.69     No results for input(s): "LACTIC" in the last 72 hours.  FENGI:  Recent Labs     05/29/24  1200 05/30/24  0433    140   K 3.7 3.5   CO2 20* 22*   CALCIUM 9.9 9.0   ALBUMIN 4.2 3.3*   BILITOT 1.4 2.0*   AST 1,008* 606*   ALKPHOS 320* 289*   ALT 1,038* 873*     Heme:  Recent Labs     05/29/24  1200 05/29/24  1305 05/30/24  0433   HGB 13.4  --  11.9*   HCT 39.7  --  36.9*     --  117*   PTT  --  25.2  --    INR  --  1.0 1.1     ID:  Recent Labs     05/29/24  1200 05/30/24  0433   WBC 11.05 9.79     CBG:  Recent Labs     05/29/24  1200 05/30/24  0433   GLUCOSE 140* 96      Cardiovascular:  No results for input(s): "TROPONINI", "CKTOTAL", "CKMB", "BNP" in the last 168 hours.  ABG:  No results for input(s): "PH", "PO2", "PCO2", "HCO3", "BE" in the last 168 hours.   I have reviewed all pertinent lab results within the past 24 hours.    Imaging:  US Abdomen Limited_Liver   Final Result      1. Cholelithiasis with distended gallbladder but negative sonographic Rose sign.  If there remains clinical suspicion of acute cholecystitis would recommend correlation with nuclear medicine HIDA scan.   2. Mild extrahepatic biliary ductal dilatation.         Electronically signed by: David Guzmán   Date:    05/29/2024   Time:    16:22      CT Abdomen Pelvis With IV Contrast NO Oral Contrast   Final Result      Distended gallbladder with numerous calcified gallstones.  Right upper quadrant ultrasound is available for correlation.         Electronically " signed by: Krista Pal   Date:    05/29/2024   Time:    13:47         I have reviewed all pertinent imaging results/findings within the past 24 hours.    Micro/Path/Other:  Microbiology Results (last 7 days)       ** No results found for the last 168 hours. **           Pathology Results  (Last 7 days)      None           Assessment & Plan:   63 y/o F without significant PMHx who presented to [facility] ED on 5/29 with a 3 day hx of RUQ abdominal pain and nausea with radiologic findings of cholelithiasis and with elevated LFTs. Clinical presentation most consistent with cholelithiasis in the setting of possible recent choledocholithiasis. Viral hepatitis also on the differential.     - No surgical intervention indicated at this time.   - GI following. MRCP ordered, will follow up results and their subsequent recommendations.   - Will follow for interval cholecystectomy if indicated.   - Rest of care per primary team.     Nancy Posadas DO  [employer] General Surgery, PGY1

## 2024-05-30 NOTE — PROGRESS NOTES
"Gastroenterology Progress Note    Subjective/Interval History:  Sudden onset RUQ pain, AST 1008, ALT 1038, , T bili 1.4, Lipase 19: CT 5/29/24 reveals Distended gallbladder with numerous calcified gallstones- Surgical consultation will follow but no indication for emergent lap rio.   Subsequent Abdominal U/S 5/29/24 with gallstones, trace pericholecystic fluid, and CBD 8mm    *Continued concern for choledocholithiasis today with LFTs trending down, T bili up to 2.0 (direct heavy)  Awaiting MRCP for clarification- MRI Machine plans to be repaired by the end of the day according to MRI technician    Acute hepatitis panel, AMA, ROBERTA, SM Ab Pending  INR remains wnl  Plt count declined 138 --> 117    She is afebrile and feeling better without pain medication      ROS:  Review of Systems   Constitutional:  Negative for chills, fever and weight loss.   HENT:  Negative for hearing loss.    Eyes:  Negative for blurred vision.   Respiratory:  Negative for cough, shortness of breath and wheezing.    Cardiovascular:  Negative for chest pain and leg swelling.   Gastrointestinal:  Positive for abdominal pain (mild RUQ soreness. no longer tender). Negative for diarrhea, heartburn, nausea and vomiting.   Genitourinary:  Negative for dysuria.   Skin:  Negative for rash.   Neurological:  Negative for dizziness, weakness and headaches.   Psychiatric/Behavioral:  Negative for depression and memory loss. The patient is not nervous/anxious.        Vital Signs:  /68   Pulse 69   Temp 97.9 °F (36.6 °C) (Oral)   Resp 18   Ht 5' 4" (1.626 m)   Wt 49.9 kg (110 lb 0.2 oz)   SpO2 97%   BMI 18.88 kg/m²   Body mass index is 18.88 kg/m².    Physical Exam:  Physical Exam  Constitutional:       General: She is not in acute distress.     Appearance: Normal appearance. She is not toxic-appearing.      Comments: thin   HENT:      Head: Normocephalic.      Nose: No congestion.      Mouth/Throat:      Mouth: Mucous membranes are dry. " "     Pharynx: Oropharynx is clear.   Eyes:      General: No scleral icterus.     Pupils: Pupils are equal, round, and reactive to light.   Cardiovascular:      Rate and Rhythm: Normal rate and regular rhythm.      Pulses: Normal pulses.      Heart sounds: Normal heart sounds. No murmur heard.  Pulmonary:      Effort: No respiratory distress.      Breath sounds: Normal breath sounds. No wheezing or rhonchi.   Abdominal:      General: Abdomen is flat. Bowel sounds are normal. There is no distension.      Palpations: Abdomen is soft.      Tenderness: There is abdominal tenderness (mild RUQ "sore"; no longer tender). There is no guarding.   Musculoskeletal:         General: No swelling or tenderness.   Skin:     General: Skin is warm and dry.      Coloration: Skin is not jaundiced.   Neurological:      Mental Status: She is alert and oriented to person, place, and time.      Motor: No weakness.   Psychiatric:         Mood and Affect: Mood normal.         Behavior: Behavior normal.         Labs:  Recent Results (from the past 48 hour(s))   EKG 12-lead    Collection Time: 05/29/24 11:33 AM   Result Value Ref Range    QRS Duration 84 ms    [facility] QTC Calculation 427 ms   Comp. Metabolic Panel    Collection Time: 05/29/24 12:00 PM   Result Value Ref Range    Sodium 138 136 - 145 mmol/L    Potassium 3.7 3.5 - 5.1 mmol/L    Chloride 105 98 - 107 mmol/L    CO2 20 (L) 23 - 31 mmol/L    Glucose 140 (H) 82 - 115 mg/dL    Blood Urea Nitrogen 12.6 9.8 - 20.1 mg/dL    Creatinine 0.78 0.55 - 1.02 mg/dL    Calcium 9.9 8.4 - 10.2 mg/dL    Protein Total 7.9 (H) 5.8 - 7.6 gm/dL    Albumin 4.2 3.4 - 4.8 g/dL    Globulin 3.7 (H) 2.4 - 3.5 gm/dL    Albumin/Globulin Ratio 1.1 1.1 - 2.0 ratio    Bilirubin Total 1.4 <=1.5 mg/dL     (H) 40 - 150 unit/L    ALT 1,038 (H) 0 - 55 unit/L    AST 1,008 (H) 5 - 34 unit/L    eGFR >60 mL/min/1.73/m2    Anion Gap 13.0 mEq/L    BUN/Creatinine Ratio 16    Lipase    Collection Time: 05/29/24 12:00 PM "   Result Value Ref Range    Lipase Level 19 <=60 U/L   CBC with Differential    Collection Time: 05/29/24 12:00 PM   Result Value Ref Range    WBC 11.05 4.50 - 11.50 x10(3)/mcL    RBC 4.14 (L) 4.20 - 5.40 x10(6)/mcL    Hgb 13.4 12.0 - 16.0 g/dL    Hct 39.7 37.0 - 47.0 %    MCV 95.9 (H) 80.0 - 94.0 fL    MCH 32.4 (H) 27.0 - 31.0 pg    MCHC 33.8 33.0 - 36.0 g/dL    RDW 11.6 11.5 - 17.0 %    Platelet 138 130 - 400 x10(3)/mcL    MPV 12.3 (H) 7.4 - 10.4 fL    Neut % 91.7 %    Lymph % 4.3 %    Mono % 3.4 %    Eos % 0.0 %    Basophil % 0.2 %    Lymph # 0.47 (L) 0.6 - 4.6 x10(3)/mcL    Neut # 10.14 (H) 2.1 - 9.2 x10(3)/mcL    Mono # 0.38 0.1 - 1.3 x10(3)/mcL    Eos # 0.00 0 - 0.9 x10(3)/mcL    Baso # 0.02 <=0.2 x10(3)/mcL    IG# 0.04 0 - 0.04 x10(3)/mcL    IG% 0.4 %    NRBC% 0.0 %    IPF 7.4 0.9 - 11.2 %   Acetaminophen Level    Collection Time: 05/29/24 12:00 PM   Result Value Ref Range    Acetaminophen Level <3.0 (L) 10.0 - 30.0 ug/ml   Protime-INR    Collection Time: 05/29/24  1:05 PM   Result Value Ref Range    PT 12.8 12.5 - 14.5 seconds    INR 1.0 <=1.3   APTT    Collection Time: 05/29/24  1:05 PM   Result Value Ref Range    PTT 25.2 23.2 - 33.7 seconds   Urinalysis, Reflex to Urine Culture    Collection Time: 05/30/24  2:26 AM    Specimen: Urine   Result Value Ref Range    Color, UA Dark-Yellow Yellow, Light-Yellow, Colorless, Straw, Dark-Yellow    Appearance, UA Clear Clear    Specific Gravity, UA 1.025 1.005 - 1.030    pH, UA 5.5 5.0 - 8.5    Protein, UA Trace (A) Negative    Glucose, UA Trace (A) Negative, Normal    Ketones, UA Negative Negative    Blood, UA 1+ (A) Negative    Bilirubin, UA 1+ (A) Negative    Urobilinogen, UA 4.0 (A) 0.2, 1.0, Normal    Nitrites, UA Negative Negative    Leukocyte Esterase, UA Negative Negative    WBC, UA 0-5 None Seen, 0-2, 3-5, 0-5 /HPF    Bacteria, UA None Seen None Seen, Trace /HPF    Squamous Epithelial Cells, UA Trace None Seen /HPF    Mucous, UA Trace (A) None Seen /LPF     RBC, UA 6-10 (A) None Seen, 0-2, 3-5, 0-5 /HPF   Comprehensive Metabolic Panel    Collection Time: 05/30/24  4:33 AM   Result Value Ref Range    Sodium 140 136 - 145 mmol/L    Potassium 3.5 3.5 - 5.1 mmol/L    Chloride 111 (H) 98 - 107 mmol/L    CO2 22 (L) 23 - 31 mmol/L    Glucose 96 82 - 115 mg/dL    Blood Urea Nitrogen 12.4 9.8 - 20.1 mg/dL    Creatinine 0.69 0.55 - 1.02 mg/dL    Calcium 9.0 8.4 - 10.2 mg/dL    Protein Total 6.5 5.8 - 7.6 gm/dL    Albumin 3.3 (L) 3.4 - 4.8 g/dL    Globulin 3.2 2.4 - 3.5 gm/dL    Albumin/Globulin Ratio 1.0 (L) 1.1 - 2.0 ratio    Bilirubin Total 2.0 (H) <=1.5 mg/dL     (H) 40 - 150 unit/L     (H) 0 - 55 unit/L     (H) 5 - 34 unit/L    eGFR >60 mL/min/1.73/m2    Anion Gap 7.0 mEq/L    BUN/Creatinine Ratio 18    Protime-INR    Collection Time: 05/30/24  4:33 AM   Result Value Ref Range    PT 13.7 12.5 - 14.5 seconds    INR 1.1 <=1.3   CBC with Differential    Collection Time: 05/30/24  4:33 AM   Result Value Ref Range    WBC 9.79 4.50 - 11.50 x10(3)/mcL    RBC 3.77 (L) 4.20 - 5.40 x10(6)/mcL    Hgb 11.9 (L) 12.0 - 16.0 g/dL    Hct 36.9 (L) 37.0 - 47.0 %    MCV 97.9 (H) 80.0 - 94.0 fL    MCH 31.6 (H) 27.0 - 31.0 pg    MCHC 32.2 (L) 33.0 - 36.0 g/dL    RDW 11.9 11.5 - 17.0 %    Platelet 117 (L) 130 - 400 x10(3)/mcL    MPV 12.2 (H) 7.4 - 10.4 fL    Neut % 76.0 %    Lymph % 10.8 %    Mono % 12.3 %    Eos % 0.3 %    Basophil % 0.3 %    Lymph # 1.06 0.6 - 4.6 x10(3)/mcL    Neut # 7.44 2.1 - 9.2 x10(3)/mcL    Mono # 1.20 0.1 - 1.3 x10(3)/mcL    Eos # 0.03 0 - 0.9 x10(3)/mcL    Baso # 0.03 <=0.2 x10(3)/mcL    IG# 0.03 0 - 0.04 x10(3)/mcL    IG% 0.3 %    NRBC% 0.0 %   Bilirubin, Direct    Collection Time: 05/30/24  4:33 AM   Result Value Ref Range    Bilirubin Direct 1.4 (H) 0.0 - <0.5 mg/dL       Imaging:  US Abdomen Limited_Liver    Result Date: 5/29/2024  EXAMINATION: US ABDOMEN LIMITED_LIVER CLINICAL HISTORY: RUQ pain; COMPARISON: CT earlier today. FINDINGS:  Grayscale, color and spectral doppler evaluation of the right upper quadrant. No focal abnormality of limited visualized pancreas. Imaged portion of the IVC is normal in caliber. Liver is not significantly enlarged. No focal liver lesion. Normal hepatopetal flow is noted in the portal vein. There are gallstones.  The gallbladder is distended and there is trace pericholecystic fluid.  Sonographic Rose sign was negative.  The common bile duct is mildly dilated measuring up to 8 mm. Right kidney measures 9 cm in length. No hydronephrosis.     1. Cholelithiasis with distended gallbladder but negative sonographic Rose sign.  If there remains clinical suspicion of acute cholecystitis would recommend correlation with nuclear medicine HIDA scan. 2. Mild extrahepatic biliary ductal dilatation. Electronically signed by: David Guzmán Date:    05/29/2024 Time:    16:22    CT Abdomen Pelvis With IV Contrast NO Oral Contrast    Result Date: 5/29/2024  EXAMINATION: CT ABDOMEN PELVIS WITH IV CONTRAST CLINICAL HISTORY: transaminitis; TECHNIQUE: CT imaging was performed of the abdomen and pelvis after the administration of intravenous contrast. Dose length product is 188 mGycm. Automatic exposure control, adjustment of mA/kV or iterative reconstruction technique was used to limit radiation dose. COMPARISON: None FINDINGS: Liver: There are multiple scattered small hepatic hypodensities measuring up to 7 mm in size. Gallbladder and biliary tree: Gallbladder is distended with numerous calcified gallstones.  No intra or extrahepatic biliary ductal dilation. Pancreas: Normal. Spleen: Normal. Adrenals: Normal. Kidneys and ureters: Bilateral renal cysts.  No hydronephrosis. Bladder: Normal. Reproductive organs: The uterus has been surgically resected.  A 2 cm right-sided Bartholin's gland cyst is noted. Stomach/bowel: No evidence of bowel obstruction. Appendix is normal. No discernible bowel inflammation. Lymph nodes: No pathologically  enlarged lymph node identified. Peritoneum: No ascites or free air. No fluid collection. Vessels: Mild scattered vascular calcifications. Abdominal wall: Normal. Lung bases: No consolidation or pleural effusion. Bones: No acute osseous findings.     Distended gallbladder with numerous calcified gallstones.  Right upper quadrant ultrasound is available for correlation. Electronically signed by: Krista Pal Date:    05/29/2024 Time:    13:47         Assessment/Plan:  Transaminitis  Cholelithiasis noted and suspected as the culprit with sudden onset of epigastric pain after fatty meal  Hepatitis panel, AMA, ROBERTA, Sm Ab pending  No high risk factors for sudden liver dysfunction  Trend labs- LFTs improving  Cholelithiasis  Abdominal U/S with CBD 8 mm  MRI machine is down today with plans to be fixed this afternoon.   Clear liquids until MRI machine repaired.    Could consider diagnostic ERCP if MRI machine not able to be fixed today. EUS not available in house this week. Risk of diagnostic ERCP outweigh the benefits if MRI machine is expected to be fixed today. Will await MRI        Clara Maurer NP acting as scribe for Dr. Khari Branch MD

## 2024-05-30 NOTE — PROGRESS NOTES
[facility]   Consult Note  Trauma and Acute Care Surgery    Patient Name: Yana Rand  YOB: 1960  Date of Admission: 5/29/2024  Date: 05/30/2024 12:25 AM  Consulting Service: ED  Reason for Consult: RUQ with concern for acute cholecystitis    HD#1  POD#* No surgery found *    PRESENTING HISTORY     Chief Complaint/Reason for Admission: <principal problem not specified>    History of Present Illness:  65 yo female without significant PMHx p/w a 3-day hx of RUQ pain and nausea. her pain started the morning after she ate a heavy meal on memorial day. she has never experienced similar pain in the past. initially thought it was due to acid reflux, but the pain continued to worsen to the point where she had difficulty walking. associated nausea and 1 episode of emesis today. radiologic workup remarkable for a large, distended gallbladder with mild  CBD dilation. AF and no leukocytosis but elevated LFTs with borderline Tbili. surgery consulted for further evaluation    Review of Systems:  12 point ROS negative except as stated in HPI    PAST HISTORY:   Past medical history:  No past medical history on file.  No past medical history on file.    Past surgical history:  No past surgical history on file.  No past surgical history on file.    Family history:  No family history on file.    Social history:  Social History     Socioeconomic History    Marital status:      Social History     Tobacco Use   Smoking Status Not on file   Smokeless Tobacco Not on file         MEDICATIONS & ALLERGIES:   Allergies:   Review of patient's allergies indicates:   Allergen Reactions    Bactrim [sulfamethoxazole-trimethoprim]     Milk containing products (dairy)        No current facility-administered medications on file prior to encounter.     Current Outpatient Medications on File Prior to Encounter   Medication Sig    ALPRAZolam (XANAX) 0.25 MG tablet Take 0.25 mg by mouth 2 (two) times a day.     "estrogens, conjugated, (PREMARIN) 0.625 MG tablet Take 0.625 mg by mouth once daily.    polyethylene glycol (GLYCOLAX) 17 gram/dose powder Take 17 g by mouth as needed for Constipation.       Scheduled Meds:    Continuous Infusions:   sodium chloride 0.9%   Intravenous Continuous 75 mL/hr at 05/29/24 1757 New Bag at 05/29/24 1757       PRN Meds:    Current Facility-Administered Medications:     hydrALAZINE, 10 mg, Intravenous, Q6H PRN    morphine, 2 mg, Intravenous, Q6H PRN    ondansetron, 4 mg, Intravenous, Q4H PRN    promethazine, 25 mg, Intramuscular, Q6H PRN    OBJECTIVE:     Vital Signs:  Temp:  [97.7 °F (36.5 °C)-98.8 °F (37.1 °C)] 98.1 °F (36.7 °C)  Pulse:  [70-89] 81  Resp:  [17-22] 18  SpO2:  [97 %-100 %] 100 %  BP: (136-166)/(65-72) 152/71  Body mass index is 18.88 kg/m².     No intake/output data recorded.  No intake/output data recorded.    Physical Exam:  General:  Well developed, well nourished, no acute distressHEENT:  Normocephalic, atraumatic, PERRL, EOMI, clear sclera, ears normal, neck supple, throat clear without erythema or exudatesCVS:  RRRResp:  Normal work of breathing on RA, equal rise and fall of the chestGI: Abdomen soft, non-tender, non-distended, no masses, no guarding, no reboundGU:  DeferredMSK:  No muscle atrophy, cyanosis, peripheral edema, moving all extremities spontaneouslyVascular: *2+ radial pulses bilaterally*. All extremities WWPSkin:  No rashes, ulcers, erythemaNeuro:  CNII-XII grossly intact, alert and oriented to person, place, and time  Laboratory:  Recent Labs     05/29/24  1200 05/29/24  1305   WBC 11.05  --    HGB 13.4  --    HCT 39.7  --      --    PTT  --  25.2   INR  --  1.0     Recent Labs     05/29/24  1200      K 3.7   CO2 20*   BUN 12.6   CREATININE 0.78   CALCIUM 9.9   ALBUMIN 4.2   BILITOT 1.4   AST 1,008*   ALKPHOS 320*   ALT 1,038*     Troponin:  No results for input(s): "TROPONINI" in the last 72 hours.  CBC:  Recent Labs     " "05/29/24  1200   WBC 11.05   RBC 4.14*   HGB 13.4   HCT 39.7      MCV 95.9*   MCH 32.4*   MCHC 33.8     CMP:  Recent Labs     05/29/24  1200   CALCIUM 9.9   ALBUMIN 4.2      K 3.7   CO2 20*   BUN 12.6   CREATININE 0.78   ALKPHOS 320*   ALT 1,038*   AST 1,008*   BILITOT 1.4     Lactic Acid:  No results for input(s): "LACTATE" in the last 72 hours.  Etoh:  No results for input(s): "ALCOHOLMEDIC" in the last 72 hours.  Drug Screen:  No results for input(s): "PCDSCOMETHA", "COCAINEMETAB", "OPIATESCREEN", "BARBITURATES", "AMPHETAMINES", "MARIJUANATHC", "PCDSOPHENCYN", "CREATRANDUR", "TOXINFO" in the last 72 hours.    ABG:  No results for input(s): "PH", "PCO2", "PO2", "HCO3", "BE", "POCSATURATED" in the last 72 hours.    Diagnostic Results:  US Abdomen Limited_Liver    Result Date: 5/29/2024  1. Cholelithiasis with distended gallbladder but negative sonographic Rose sign.  If there remains clinical suspicion of acute cholecystitis would recommend correlation with nuclear medicine HIDA scan. 2. Mild extrahepatic biliary ductal dilatation. Electronically signed by: David Guzmán Date:    05/29/2024 Time:    16:22    CT Abdomen Pelvis With IV Contrast NO Oral Contrast    Result Date: 5/29/2024  Distended gallbladder with numerous calcified gallstones.  Right upper quadrant ultrasound is available for correlation. Electronically signed by: Krista Pal Date:    05/29/2024 Time:    13:47    US Abdomen Limited_Liver   Final Result      1. Cholelithiasis with distended gallbladder but negative sonographic Rose sign.  If there remains clinical suspicion of acute cholecystitis would recommend correlation with nuclear medicine HIDA scan.   2. Mild extrahepatic biliary ductal dilatation.         Electronically signed by: David Guzmán   Date:    05/29/2024   Time:    16:22      CT Abdomen Pelvis With IV Contrast NO Oral Contrast   Final Result      Distended gallbladder with numerous calcified gallstones.  Right " upper quadrant ultrasound is available for correlation.         Electronically signed by: Krista Pal   Date:    05/29/2024   Time:    13:47          Microbiology:  Microbiology Results (last 7 days)     ** No results found for the last 168 hours. **           ASSESSMENT & PLAN:   65 y/o F without significant PMHx presenting with a 3 day hx of RUQ abdominal pain and nausea with radiologic findings of cholelithiasis and with elevated LFTs. Clinical presentation most consistent with cholelithiasis in the setting of possible recent choledocholithiasis. Viral hepatitis also on the differential.      - Recommend admission to medicine with GI consult for further workup and ERCP to rule out choledocholithiasis  - General Surgery will follow for interval cholecystectomy if indicated  - NPO, mIVF  - Remainder of care per primary team    evin Mcdermott MD  [employer] General Surgery, PGY2     Plan:    Evin Mcdermott MD  Trauma/Acute Care Surgery  5/30/2024  12:25 AM

## 2024-05-31 ENCOUNTER — ANESTHESIA EVENT (OUTPATIENT)
Dept: SURGERY | Facility: HOSPITAL | Age: 64
DRG: 419 | End: 2024-05-31
Payer: COMMERCIAL

## 2024-05-31 ENCOUNTER — ANESTHESIA (OUTPATIENT)
Dept: SURGERY | Facility: HOSPITAL | Age: 64
DRG: 419 | End: 2024-05-31
Payer: COMMERCIAL

## 2024-05-31 LAB
ALBUMIN SERPL-MCNC: 3.2 G/DL (ref 3.4–4.8)
ALBUMIN/GLOB SERPL: 1.2 RATIO (ref 1.1–2)
ALP SERPL-CCNC: 241 UNIT/L (ref 40–150)
ALT SERPL-CCNC: 553 UNIT/L (ref 0–55)
ANA SER QL HEP2 SUBST: NORMAL
ANION GAP SERPL CALC-SCNC: 11 MEQ/L
AST SERPL-CCNC: 198 UNIT/L (ref 5–34)
BASOPHILS # BLD AUTO: 0.02 X10(3)/MCL
BASOPHILS NFR BLD AUTO: 0.2 %
BILIRUB SERPL-MCNC: 0.7 MG/DL
BUN SERPL-MCNC: 20.1 MG/DL (ref 9.8–20.1)
CALCIUM SERPL-MCNC: 8.6 MG/DL (ref 8.4–10.2)
CHLORIDE SERPL-SCNC: 113 MMOL/L (ref 98–107)
CO2 SERPL-SCNC: 18 MMOL/L (ref 23–31)
CREAT SERPL-MCNC: 0.71 MG/DL (ref 0.55–1.02)
CREAT/UREA NIT SERPL: 28
EOSINOPHIL # BLD AUTO: 0.02 X10(3)/MCL (ref 0–0.9)
EOSINOPHIL NFR BLD AUTO: 0.2 %
ERYTHROCYTE [DISTWIDTH] IN BLOOD BY AUTOMATED COUNT: 11.8 % (ref 11.5–17)
GFR SERPLBLD CREATININE-BSD FMLA CKD-EPI: >60 ML/MIN/1.73/M2
GLOBULIN SER-MCNC: 2.6 GM/DL (ref 2.4–3.5)
GLUCOSE SERPL-MCNC: 70 MG/DL (ref 82–115)
HCT VFR BLD AUTO: 36.1 % (ref 37–47)
HGB BLD-MCNC: 11.6 G/DL (ref 12–16)
IMM GRANULOCYTES # BLD AUTO: 0.03 X10(3)/MCL (ref 0–0.04)
IMM GRANULOCYTES NFR BLD AUTO: 0.3 %
INR PPP: 1.2
LYMPHOCYTES # BLD AUTO: 1.1 X10(3)/MCL (ref 0.6–4.6)
LYMPHOCYTES NFR BLD AUTO: 12.1 %
MCH RBC QN AUTO: 32.1 PG (ref 27–31)
MCHC RBC AUTO-ENTMCNC: 32.1 G/DL (ref 33–36)
MCV RBC AUTO: 100 FL (ref 80–94)
MONOCYTES # BLD AUTO: 0.63 X10(3)/MCL (ref 0.1–1.3)
MONOCYTES NFR BLD AUTO: 6.9 %
NEUTROPHILS # BLD AUTO: 7.3 X10(3)/MCL (ref 2.1–9.2)
NEUTROPHILS NFR BLD AUTO: 80.3 %
NRBC BLD AUTO-RTO: 0 %
PATH REV: NORMAL
PLATELET # BLD AUTO: 111 X10(3)/MCL (ref 130–400)
PLATELETS.RETICULATED NFR BLD AUTO: 7.4 % (ref 0.9–11.2)
PMV BLD AUTO: 11.4 FL (ref 7.4–10.4)
POTASSIUM SERPL-SCNC: 3.7 MMOL/L (ref 3.5–5.1)
PROT SERPL-MCNC: 5.8 GM/DL (ref 5.8–7.6)
PROTHROMBIN TIME: 14.8 SECONDS (ref 12.5–14.5)
RBC # BLD AUTO: 3.61 X10(6)/MCL (ref 4.2–5.4)
SODIUM SERPL-SCNC: 142 MMOL/L (ref 136–145)
WBC # SPEC AUTO: 9.1 X10(3)/MCL (ref 4.5–11.5)

## 2024-05-31 PROCEDURE — 80053 COMPREHEN METABOLIC PANEL: CPT | Performed by: INTERNAL MEDICINE

## 2024-05-31 PROCEDURE — 11000001 HC ACUTE MED/SURG PRIVATE ROOM

## 2024-05-31 PROCEDURE — 85610 PROTHROMBIN TIME: CPT | Performed by: INTERNAL MEDICINE

## 2024-05-31 PROCEDURE — 25000003 PHARM REV CODE 250: Performed by: NURSE PRACTITIONER

## 2024-05-31 PROCEDURE — G0378 HOSPITAL OBSERVATION PER HR: HCPCS

## 2024-05-31 PROCEDURE — 85025 COMPLETE CBC W/AUTO DIFF WBC: CPT | Performed by: INTERNAL MEDICINE

## 2024-05-31 PROCEDURE — 96361 HYDRATE IV INFUSION ADD-ON: CPT

## 2024-05-31 PROCEDURE — 36415 COLL VENOUS BLD VENIPUNCTURE: CPT | Performed by: INTERNAL MEDICINE

## 2024-05-31 RX ORDER — INDOCYANINE GREEN AND WATER 25 MG
2.5 KIT INJECTION ONCE
Status: CANCELLED | OUTPATIENT
Start: 2024-05-31 | End: 2024-05-31

## 2024-05-31 RX ADMIN — POLYETHYLENE GLYCOL 3350 17 G: 17 POWDER, FOR SOLUTION ORAL at 08:05

## 2024-05-31 RX ADMIN — SODIUM CHLORIDE: 9 INJECTION, SOLUTION INTRAVENOUS at 07:05

## 2024-05-31 NOTE — ANESTHESIA PREPROCEDURE EVALUATION
05/31/2024  Yana Rand is a 63 y.o., female.   with Sudden onset RUQ pain, AST 1008, ALT 1038, , T bili 1.4, Lipase 19: CT 5/29/24 reveals Distended gallbladder with numerous calcified gallstones- Surgical consultation will follow but no indication for emergent lap rio.   Subsequent Abdominal U/S 5/29/24 with gallstones, trace pericholecystic fluid, and CBD 8mm     Transaminitis  Cholelithiasis noted and suspected as the culprit with sudden onset of epigastric pain after fatty meal  Hepatitis panel and AMA, negative;  ROBERTA, Sm Ab pending  No high risk factors for sudden liver dysfunction  Trend labs- LFTs improving  Cholelithiasis  Abdominal U/S with CBD 8 mm  MRI machine is down today  Clear liquids until MRI machine repaired.     Risk of diagnostic ERCP outweigh the benefits    Pre-op Assessment    I have reviewed the Patient Summary Reports.     I have reviewed the Nursing Notes. I have reviewed the NPO Status.   I have reviewed the Medications.     Review of Systems  Anesthesia Hx:  No problems with previous Anesthesia                Social:  Non-Smoker       Cardiovascular:  Exercise tolerance: good                                               Physical Exam  General: Well nourished, Cooperative, Alert and Oriented    Airway:  Mallampati: II   Mouth Opening: Normal  TM Distance: Normal  Tongue: Normal  Neck ROM: Extension Decreased    Dental:  Intact    Chest/Lungs:  Clear to auscultation, Normal Respiratory Rate    Heart:  Rate: Normal  Rhythm: Regular Rhythm        Anesthesia Plan  Type of Anesthesia, risks & benefits discussed:    Anesthesia Type: Gen ETT  Intra-op Monitoring Plan: Standard ASA Monitors  Post Op Pain Control Plan: multimodal analgesia and IV/PO Opioids PRN  Induction:  IV  Airway Plan: Direct, Post-Induction  Informed Consent: Informed consent signed with the Patient and all  parties understand the risks and agree with anesthesia plan.  All questions answered.   ASA Score: 2  Day of Surgery Review of History & Physical: H&P Update referred to the surgeon/provider.    Ready For Surgery From Anesthesia Perspective.     .

## 2024-05-31 NOTE — PROGRESS NOTES
"Gastroenterology Progress Note    Subjective/Interval History:  *concern for choledocholithiasis   However, LFTs trending down, T bili  back to normal  Awaiting MRCP for clarification- MRI Machine still down.    Acute hepatitis panel negative , AMA negative; ROBERTA and SM Ab Pending  INR remains wnl  Plt count declined 138 --> 117 --> 111    She is afebrile and feeling better without pain medication      ROS:  Review of Systems   Constitutional:  Negative for chills, fever and weight loss.   HENT:  Negative for hearing loss.    Eyes:  Negative for blurred vision.   Respiratory:  Negative for cough, shortness of breath and wheezing.    Cardiovascular:  Negative for chest pain and leg swelling.   Gastrointestinal:  Positive for abdominal pain (mild RUQ soreness. no longer tender). Negative for diarrhea, heartburn, nausea and vomiting.   Genitourinary:  Negative for dysuria.   Skin:  Negative for rash.   Neurological:  Negative for dizziness, weakness and headaches.   Psychiatric/Behavioral:  Negative for depression and memory loss. The patient is not nervous/anxious.        Vital Signs:  BP (!) 120/52   Pulse 82   Temp 97.8 °F (36.6 °C) (Oral)   Resp 18   Ht 5' 4" (1.626 m)   Wt 49.9 kg (110 lb 0.2 oz)   SpO2 100%   BMI 18.88 kg/m²   Body mass index is 18.88 kg/m².    Physical Exam:  Physical Exam  Constitutional:       General: She is not in acute distress.     Appearance: Normal appearance. She is not toxic-appearing.      Comments: thin   HENT:      Head: Normocephalic.      Nose: No congestion.      Mouth/Throat:      Mouth: Mucous membranes are dry.      Pharynx: Oropharynx is clear.   Eyes:      General: No scleral icterus.     Pupils: Pupils are equal, round, and reactive to light.   Cardiovascular:      Rate and Rhythm: Normal rate and regular rhythm.      Pulses: Normal pulses.      Heart sounds: Normal heart sounds. No murmur heard.  Pulmonary:      Effort: No respiratory distress.      Breath sounds: " "Normal breath sounds. No wheezing or rhonchi.   Abdominal:      General: Abdomen is flat. Bowel sounds are normal. There is no distension.      Palpations: Abdomen is soft.      Tenderness: There is abdominal tenderness (mild RUQ "sore"; no longer tender). There is no guarding.   Musculoskeletal:         General: No swelling or tenderness.   Skin:     General: Skin is warm and dry.      Coloration: Skin is not jaundiced.   Neurological:      Mental Status: She is alert and oriented to person, place, and time.      Motor: No weakness.   Psychiatric:         Mood and Affect: Mood normal.         Behavior: Behavior normal.         Labs:  Recent Results (from the past 48 hour(s))   EKG 12-lead    Collection Time: 05/29/24 11:33 AM   Result Value Ref Range    QRS Duration 84 ms    [facility] QTC Calculation 427 ms   Comp. Metabolic Panel    Collection Time: 05/29/24 12:00 PM   Result Value Ref Range    Sodium 138 136 - 145 mmol/L    Potassium 3.7 3.5 - 5.1 mmol/L    Chloride 105 98 - 107 mmol/L    CO2 20 (L) 23 - 31 mmol/L    Glucose 140 (H) 82 - 115 mg/dL    Blood Urea Nitrogen 12.6 9.8 - 20.1 mg/dL    Creatinine 0.78 0.55 - 1.02 mg/dL    Calcium 9.9 8.4 - 10.2 mg/dL    Protein Total 7.9 (H) 5.8 - 7.6 gm/dL    Albumin 4.2 3.4 - 4.8 g/dL    Globulin 3.7 (H) 2.4 - 3.5 gm/dL    Albumin/Globulin Ratio 1.1 1.1 - 2.0 ratio    Bilirubin Total 1.4 <=1.5 mg/dL     (H) 40 - 150 unit/L    ALT 1,038 (H) 0 - 55 unit/L    AST 1,008 (H) 5 - 34 unit/L    eGFR >60 mL/min/1.73/m2    Anion Gap 13.0 mEq/L    BUN/Creatinine Ratio 16    Lipase    Collection Time: 05/29/24 12:00 PM   Result Value Ref Range    Lipase Level 19 <=60 U/L   CBC with Differential    Collection Time: 05/29/24 12:00 PM   Result Value Ref Range    WBC 11.05 4.50 - 11.50 x10(3)/mcL    RBC 4.14 (L) 4.20 - 5.40 x10(6)/mcL    Hgb 13.4 12.0 - 16.0 g/dL    Hct 39.7 37.0 - 47.0 %    MCV 95.9 (H) 80.0 - 94.0 fL    MCH 32.4 (H) 27.0 - 31.0 pg    MCHC 33.8 33.0 - 36.0 g/dL    " RDW 11.6 11.5 - 17.0 %    Platelet 138 130 - 400 x10(3)/mcL    MPV 12.3 (H) 7.4 - 10.4 fL    Neut % 91.7 %    Lymph % 4.3 %    Mono % 3.4 %    Eos % 0.0 %    Basophil % 0.2 %    Lymph # 0.47 (L) 0.6 - 4.6 x10(3)/mcL    Neut # 10.14 (H) 2.1 - 9.2 x10(3)/mcL    Mono # 0.38 0.1 - 1.3 x10(3)/mcL    Eos # 0.00 0 - 0.9 x10(3)/mcL    Baso # 0.02 <=0.2 x10(3)/mcL    IG# 0.04 0 - 0.04 x10(3)/mcL    IG% 0.4 %    NRBC% 0.0 %    IPF 7.4 0.9 - 11.2 %   Acetaminophen Level    Collection Time: 05/29/24 12:00 PM   Result Value Ref Range    Acetaminophen Level <3.0 (L) 10.0 - 30.0 ug/ml   Hepatitis Panel, Acute    Collection Time: 05/29/24 12:00 PM   Result Value Ref Range    Hep A IgM Interp Nonreactive Nonreactive    Hep B Core IgM Interp Nonreactive Nonreactive    Hep BsAg Interp Nonreactive Nonreactive    Hep C Ab Interp Nonreactive Nonreactive   Protime-INR    Collection Time: 05/29/24  1:05 PM   Result Value Ref Range    PT 12.8 12.5 - 14.5 seconds    INR 1.0 <=1.3   APTT    Collection Time: 05/29/24  1:05 PM   Result Value Ref Range    PTT 25.2 23.2 - 33.7 seconds   Urinalysis, Reflex to Urine Culture    Collection Time: 05/30/24  2:26 AM    Specimen: Urine   Result Value Ref Range    Color, UA Dark-Yellow Yellow, Light-Yellow, Colorless, Straw, Dark-Yellow    Appearance, UA Clear Clear    Specific Gravity, UA 1.025 1.005 - 1.030    pH, UA 5.5 5.0 - 8.5    Protein, UA Trace (A) Negative    Glucose, UA Trace (A) Negative, Normal    Ketones, UA Negative Negative    Blood, UA 1+ (A) Negative    Bilirubin, UA 1+ (A) Negative    Urobilinogen, UA 4.0 (A) 0.2, 1.0, Normal    Nitrites, UA Negative Negative    Leukocyte Esterase, UA Negative Negative    WBC, UA 0-5 None Seen, 0-2, 3-5, 0-5 /HPF    Bacteria, UA None Seen None Seen, Trace /HPF    Squamous Epithelial Cells, UA Trace None Seen /HPF    Mucous, UA Trace (A) None Seen /LPF    RBC, UA 6-10 (A) None Seen, 0-2, 3-5, 0-5 /HPF   Comprehensive Metabolic Panel    Collection Time:  05/30/24  4:33 AM   Result Value Ref Range    Sodium 140 136 - 145 mmol/L    Potassium 3.5 3.5 - 5.1 mmol/L    Chloride 111 (H) 98 - 107 mmol/L    CO2 22 (L) 23 - 31 mmol/L    Glucose 96 82 - 115 mg/dL    Blood Urea Nitrogen 12.4 9.8 - 20.1 mg/dL    Creatinine 0.69 0.55 - 1.02 mg/dL    Calcium 9.0 8.4 - 10.2 mg/dL    Protein Total 6.5 5.8 - 7.6 gm/dL    Albumin 3.3 (L) 3.4 - 4.8 g/dL    Globulin 3.2 2.4 - 3.5 gm/dL    Albumin/Globulin Ratio 1.0 (L) 1.1 - 2.0 ratio    Bilirubin Total 2.0 (H) <=1.5 mg/dL     (H) 40 - 150 unit/L     (H) 0 - 55 unit/L     (H) 5 - 34 unit/L    eGFR >60 mL/min/1.73/m2    Anion Gap 7.0 mEq/L    BUN/Creatinine Ratio 18    Protime-INR    Collection Time: 05/30/24  4:33 AM   Result Value Ref Range    PT 13.7 12.5 - 14.5 seconds    INR 1.1 <=1.3   CBC with Differential    Collection Time: 05/30/24  4:33 AM   Result Value Ref Range    WBC 9.79 4.50 - 11.50 x10(3)/mcL    RBC 3.77 (L) 4.20 - 5.40 x10(6)/mcL    Hgb 11.9 (L) 12.0 - 16.0 g/dL    Hct 36.9 (L) 37.0 - 47.0 %    MCV 97.9 (H) 80.0 - 94.0 fL    MCH 31.6 (H) 27.0 - 31.0 pg    MCHC 32.2 (L) 33.0 - 36.0 g/dL    RDW 11.9 11.5 - 17.0 %    Platelet 117 (L) 130 - 400 x10(3)/mcL    MPV 12.2 (H) 7.4 - 10.4 fL    Neut % 76.0 %    Lymph % 10.8 %    Mono % 12.3 %    Eos % 0.3 %    Basophil % 0.3 %    Lymph # 1.06 0.6 - 4.6 x10(3)/mcL    Neut # 7.44 2.1 - 9.2 x10(3)/mcL    Mono # 1.20 0.1 - 1.3 x10(3)/mcL    Eos # 0.03 0 - 0.9 x10(3)/mcL    Baso # 0.03 <=0.2 x10(3)/mcL    IG# 0.03 0 - 0.04 x10(3)/mcL    IG% 0.3 %    NRBC% 0.0 %   Bilirubin, Direct    Collection Time: 05/30/24  4:33 AM   Result Value Ref Range    Bilirubin Direct 1.4 (H) 0.0 - <0.5 mg/dL   Mitochondrial M2 IgG Antibody (In-House)    Collection Time: 05/30/24  4:37 AM   Result Value Ref Range    M2 IgG Ab Quant 0.9 <4.0 U/mL   Comprehensive Metabolic Panel    Collection Time: 05/31/24  4:44 AM   Result Value Ref Range    Sodium 142 136 - 145 mmol/L    Potassium 3.7  3.5 - 5.1 mmol/L    Chloride 113 (H) 98 - 107 mmol/L    CO2 18 (L) 23 - 31 mmol/L    Glucose 70 (L) 82 - 115 mg/dL    Blood Urea Nitrogen 20.1 9.8 - 20.1 mg/dL    Creatinine 0.71 0.55 - 1.02 mg/dL    Calcium 8.6 8.4 - 10.2 mg/dL    Protein Total 5.8 5.8 - 7.6 gm/dL    Albumin 3.2 (L) 3.4 - 4.8 g/dL    Globulin 2.6 2.4 - 3.5 gm/dL    Albumin/Globulin Ratio 1.2 1.1 - 2.0 ratio    Bilirubin Total 0.7 <=1.5 mg/dL     (H) 40 - 150 unit/L     (H) 0 - 55 unit/L     (H) 5 - 34 unit/L    eGFR >60 mL/min/1.73/m2    Anion Gap 11.0 mEq/L    BUN/Creatinine Ratio 28    Protime-INR    Collection Time: 05/31/24  4:44 AM   Result Value Ref Range    PT 14.8 (H) 12.5 - 14.5 seconds    INR 1.2 <=1.3   CBC with Differential    Collection Time: 05/31/24  4:44 AM   Result Value Ref Range    WBC 9.10 4.50 - 11.50 x10(3)/mcL    RBC 3.61 (L) 4.20 - 5.40 x10(6)/mcL    Hgb 11.6 (L) 12.0 - 16.0 g/dL    Hct 36.1 (L) 37.0 - 47.0 %    .0 (H) 80.0 - 94.0 fL    MCH 32.1 (H) 27.0 - 31.0 pg    MCHC 32.1 (L) 33.0 - 36.0 g/dL    RDW 11.8 11.5 - 17.0 %    Platelet 111 (L) 130 - 400 x10(3)/mcL    MPV 11.4 (H) 7.4 - 10.4 fL    Neut % 80.3 %    Lymph % 12.1 %    Mono % 6.9 %    Eos % 0.2 %    Basophil % 0.2 %    Lymph # 1.10 0.6 - 4.6 x10(3)/mcL    Neut # 7.30 2.1 - 9.2 x10(3)/mcL    Mono # 0.63 0.1 - 1.3 x10(3)/mcL    Eos # 0.02 0 - 0.9 x10(3)/mcL    Baso # 0.02 <=0.2 x10(3)/mcL    IG# 0.03 0 - 0.04 x10(3)/mcL    IG% 0.3 %    NRBC% 0.0 %    IPF 7.4 0.9 - 11.2 %       Imaging:  US Abdomen Limited_Liver    Result Date: 5/29/2024  EXAMINATION: US ABDOMEN LIMITED_LIVER CLINICAL HISTORY: RUQ pain; COMPARISON: CT earlier today. FINDINGS: Grayscale, color and spectral doppler evaluation of the right upper quadrant. No focal abnormality of limited visualized pancreas. Imaged portion of the IVC is normal in caliber. Liver is not significantly enlarged. No focal liver lesion. Normal hepatopetal flow is noted in the portal vein. There are  gallstones.  The gallbladder is distended and there is trace pericholecystic fluid.  Sonographic Rose sign was negative.  The common bile duct is mildly dilated measuring up to 8 mm. Right kidney measures 9 cm in length. No hydronephrosis.     1. Cholelithiasis with distended gallbladder but negative sonographic Rose sign.  If there remains clinical suspicion of acute cholecystitis would recommend correlation with nuclear medicine HIDA scan. 2. Mild extrahepatic biliary ductal dilatation. Electronically signed by: David Guzmán Date:    05/29/2024 Time:    16:22    CT Abdomen Pelvis With IV Contrast NO Oral Contrast    Result Date: 5/29/2024  EXAMINATION: CT ABDOMEN PELVIS WITH IV CONTRAST CLINICAL HISTORY: transaminitis; TECHNIQUE: CT imaging was performed of the abdomen and pelvis after the administration of intravenous contrast. Dose length product is 188 mGycm. Automatic exposure control, adjustment of mA/kV or iterative reconstruction technique was used to limit radiation dose. COMPARISON: None FINDINGS: Liver: There are multiple scattered small hepatic hypodensities measuring up to 7 mm in size. Gallbladder and biliary tree: Gallbladder is distended with numerous calcified gallstones.  No intra or extrahepatic biliary ductal dilation. Pancreas: Normal. Spleen: Normal. Adrenals: Normal. Kidneys and ureters: Bilateral renal cysts.  No hydronephrosis. Bladder: Normal. Reproductive organs: The uterus has been surgically resected.  A 2 cm right-sided Bartholin's gland cyst is noted. Stomach/bowel: No evidence of bowel obstruction. Appendix is normal. No discernible bowel inflammation. Lymph nodes: No pathologically enlarged lymph node identified. Peritoneum: No ascites or free air. No fluid collection. Vessels: Mild scattered vascular calcifications. Abdominal wall: Normal. Lung bases: No consolidation or pleural effusion. Bones: No acute osseous findings.     Distended gallbladder with numerous calcified  gallstones.  Right upper quadrant ultrasound is available for correlation. Electronically signed by: Krista Pal Date:    05/29/2024 Time:    13:47         Assessment/Plan:    64 yo with Sudden onset RUQ pain, AST 1008, ALT 1038, , T bili 1.4, Lipase 19: CT 5/29/24 reveals Distended gallbladder with numerous calcified gallstones- Surgical consultation will follow but no indication for emergent lap rio.   Subsequent Abdominal U/S 5/29/24 with gallstones, trace pericholecystic fluid, and CBD 8mm    Transaminitis  Cholelithiasis noted and suspected as the culprit with sudden onset of epigastric pain after fatty meal  Hepatitis panel and AMA, negative;  ROBERTA, Sm Ab pending  No high risk factors for sudden liver dysfunction  Trend labs- LFTs improving  Cholelithiasis  Abdominal U/S with CBD 8 mm  MRI machine is down today  Clear liquids until MRI machine repaired.    Risk of diagnostic ERCP outweigh the benefits if MRI machine is expected to be fixed today. Will await MRI unless surgery feels lap rio with IOC will be efficient enough given patient's clinical course and lab improvement     **Update: Spoke with surgical resident Dr. Posadas- they do plan to take patient for lap rio today. IOC to be discussed and arranged if attending agrees       Clara Maurer NP acting as scribe for Dr. Khari Branch MD

## 2024-05-31 NOTE — PROGRESS NOTES
[facility]  Hospital Medicine Progress Note        Chief Complaint: Inpatient Follow-up for elevated LFTs     HPI:   Yana Rand is a 63 y.o. female with a PMHx of anxiety who presented to [facility] on 5/29/2024 with c/o epigastric abdominal pain with associated constipation x 2 days.  She also endorsed nausea and vomiting.  She reportedly took MiraLax with some improvement with last BM on the morning of presentation..     Vital Signs upon presentation to the ED included /67, HR 70, RR 18, SpO2 100% on room air, temperature 97.7° F. Labs notable for CO2 20, glucose 140, , AST 1008, ALT 1038.  Acetaminophen level undetectable.  CT abdomen and pelvis with IV contrast demonstrates a distended gallbladder with numerous calcified gallstones.  GI consulted in ED. She received IV Zofran, IV morphine, Pepcid and IV fluids in the ED. Admitted to hospital medicine service for further medical management.    Seen by GI team and recommended cholecystectomy. Surgery team following patient,.     Interval Hx:   Patient today awake and comfortable. Has no new issues. No abdominal pain.     Family at bedside, explained in detail about the patients condition, diagnosis, vitals, labs and treatment plan. They understand and agree with the plan. All their questions were answered.      Case was discussed with patient's nurse and  on the floor.    Objective/physical exam:  General: In no acute distress  Chest: Clear to auscultation bilaterally  Heart: RRR, +S1, S2, no appreciable murmur  Abdomen: Soft, nontender, BS +  Neurologic: Alert and oriented x4, Cranial nerve II-XII intact, Strength 5/5 in all 4 extremities    VITAL SIGNS: 24 HRS MIN & MAX LAST   Temp  Min: 97.5 °F (36.4 °C)  Max: 98.2 °F (36.8 °C) 98.1 °F (36.7 °C)   BP  Min: 118/62  Max: 137/64 128/63   Pulse  Min: 65  Max: 82  79   Resp  Min: 18  Max: 18 18   SpO2  Min: 99 %  Max: 100 % 99 %     I have reviewed the following  labs:  Recent Labs   Lab 05/29/24  1200 05/30/24  0433 05/31/24  0444   WBC 11.05 9.79 9.10   RBC 4.14* 3.77* 3.61*   HGB 13.4 11.9* 11.6*   HCT 39.7 36.9* 36.1*   MCV 95.9* 97.9* 100.0*   MCH 32.4* 31.6* 32.1*   MCHC 33.8 32.2* 32.1*   RDW 11.6 11.9 11.8    117* 111*   MPV 12.3* 12.2* 11.4*     Recent Labs   Lab 05/29/24  1200 05/30/24  0433 05/31/24  0444    140 142   K 3.7 3.5 3.7    111* 113*   CO2 20* 22* 18*   BUN 12.6 12.4 20.1   CREATININE 0.78 0.69 0.71   CALCIUM 9.9 9.0 8.6   ALBUMIN 4.2 3.3* 3.2*   ALKPHOS 320* 289* 241*   ALT 1,038* 873* 553*   AST 1,008* 606* 198*   BILITOT 1.4 2.0* 0.7     Microbiology Results (last 7 days)       ** No results found for the last 168 hours. **             See below for Radiology    Scheduled Med:   polyethylene glycol  17 g Oral Daily      Continuous Infusions:   sodium chloride 0.9%   Intravenous Continuous 75 mL/hr at 05/29/24 1757 New Bag at 05/29/24 1757      PRN Meds:    Current Facility-Administered Medications:     hydrALAZINE, 10 mg, Intravenous, Q6H PRN    morphine, 2 mg, Intravenous, Q6H PRN    ondansetron, 4 mg, Intravenous, Q4H PRN    promethazine, 25 mg, Intramuscular, Q6H PRN     Assessment/Plan:  Epigastric, RUQ abdominal pain with Elevated LFTs   Cholelithiasis     History:  Anxiety    Plan:  Patient doing well with no acute issues. Has been afebrile  Ambulating well.   Will continue iv fluids, Now NPO and scheduled for cholecystectomy   LFTs trending down - , , T bili 0.7    Labs in am     Continue supportive care        VTE prophylaxis: SCD    Patient condition:  Fair    Anticipated discharge and Disposition:   Home when cleared by Surgery team       All diagnosis and differential diagnosis have been reviewed; assessment and plan has been documented; I have personally reviewed the labs and test results that are presently available; I have reviewed the patients medication list; I have reviewed the consulting providers  response and recommendations. I have reviewed or attempted to review medical records based upon their availability    All of the patient's questions have been  addressed and answered. Patient's is agreeable to the above stated plan. I will continue to monitor closely and make adjustments to medical management as needed.  _____________________________________________________________________    Nutrition Status:    Radiology:  I have personally reviewed the following imaging and agree with the radiologist.     US Abdomen Limited_Liver  Narrative: EXAMINATION:  US ABDOMEN LIMITED_LIVER    CLINICAL HISTORY:  RUQ pain;    COMPARISON:  CT earlier today.    FINDINGS:  Grayscale, color and spectral doppler evaluation of the right upper quadrant.    No focal abnormality of limited visualized pancreas. Imaged portion of the IVC is normal in caliber.    Liver is not significantly enlarged. No focal liver lesion. Normal hepatopetal flow is noted in the portal vein.    There are gallstones.  The gallbladder is distended and there is trace pericholecystic fluid.  Sonographic Rose sign was negative.  The common bile duct is mildly dilated measuring up to 8 mm.    Right kidney measures 9 cm in length. No hydronephrosis.  Impression: 1. Cholelithiasis with distended gallbladder but negative sonographic Rose sign.  If there remains clinical suspicion of acute cholecystitis would recommend correlation with nuclear medicine HIDA scan.  2. Mild extrahepatic biliary ductal dilatation.    Electronically signed by: David Guzmán  Date:    05/29/2024  Time:    16:22  CT Abdomen Pelvis With IV Contrast NO Oral Contrast  Narrative: EXAMINATION:  CT ABDOMEN PELVIS WITH IV CONTRAST    CLINICAL HISTORY:  transaminitis;    TECHNIQUE:  CT imaging was performed of the abdomen and pelvis after the administration of intravenous contrast. Dose length product is 188 mGycm. Automatic exposure control, adjustment of mA/kV or iterative reconstruction  technique was used to limit radiation dose.    COMPARISON:  None    FINDINGS:  Liver: There are multiple scattered small hepatic hypodensities measuring up to 7 mm in size.    Gallbladder and biliary tree: Gallbladder is distended with numerous calcified gallstones.  No intra or extrahepatic biliary ductal dilation.    Pancreas: Normal.    Spleen: Normal.    Adrenals: Normal.    Kidneys and ureters: Bilateral renal cysts.  No hydronephrosis.    Bladder: Normal.    Reproductive organs: The uterus has been surgically resected.  A 2 cm right-sided Bartholin's gland cyst is noted.    Stomach/bowel: No evidence of bowel obstruction. Appendix is normal. No discernible bowel inflammation.    Lymph nodes: No pathologically enlarged lymph node identified.    Peritoneum: No ascites or free air. No fluid collection.    Vessels: Mild scattered vascular calcifications.    Abdominal wall: Normal.    Lung bases: No consolidation or pleural effusion.    Bones: No acute osseous findings.  Impression: Distended gallbladder with numerous calcified gallstones.  Right upper quadrant ultrasound is available for correlation.    Electronically signed by: Krista Pal  Date:    05/29/2024  Time:    13:47      Catracho Jin MD  Department of Hospital Medicine   [facility]   05/31/2024

## 2024-06-01 LAB
ALBUMIN SERPL-MCNC: 3.5 G/DL (ref 3.4–4.8)
ALBUMIN/GLOB SERPL: 1.2 RATIO (ref 1.1–2)
ALP SERPL-CCNC: 223 UNIT/L (ref 40–150)
ALT SERPL-CCNC: 439 UNIT/L (ref 0–55)
ANION GAP SERPL CALC-SCNC: 12 MEQ/L
AST SERPL-CCNC: 97 UNIT/L (ref 5–34)
BASOPHILS # BLD AUTO: 0.04 X10(3)/MCL
BASOPHILS NFR BLD AUTO: 0.4 %
BILIRUB SERPL-MCNC: 0.7 MG/DL
BUN SERPL-MCNC: 16.7 MG/DL (ref 9.8–20.1)
CALCIUM SERPL-MCNC: 8.9 MG/DL (ref 8.4–10.2)
CHLORIDE SERPL-SCNC: 112 MMOL/L (ref 98–107)
CO2 SERPL-SCNC: 17 MMOL/L (ref 23–31)
CREAT SERPL-MCNC: 0.72 MG/DL (ref 0.55–1.02)
CREAT/UREA NIT SERPL: 23
EOSINOPHIL # BLD AUTO: 0.08 X10(3)/MCL (ref 0–0.9)
EOSINOPHIL NFR BLD AUTO: 0.8 %
ERYTHROCYTE [DISTWIDTH] IN BLOOD BY AUTOMATED COUNT: 11.9 % (ref 11.5–17)
GFR SERPLBLD CREATININE-BSD FMLA CKD-EPI: >60 ML/MIN/1.73/M2
GLOBULIN SER-MCNC: 2.9 GM/DL (ref 2.4–3.5)
GLUCOSE SERPL-MCNC: 83 MG/DL (ref 82–115)
HCT VFR BLD AUTO: 37.3 % (ref 37–47)
HGB BLD-MCNC: 12 G/DL (ref 12–16)
IMM GRANULOCYTES # BLD AUTO: 0.03 X10(3)/MCL (ref 0–0.04)
IMM GRANULOCYTES NFR BLD AUTO: 0.3 %
LYMPHOCYTES # BLD AUTO: 1.63 X10(3)/MCL (ref 0.6–4.6)
LYMPHOCYTES NFR BLD AUTO: 15.6 %
MCH RBC QN AUTO: 32.3 PG (ref 27–31)
MCHC RBC AUTO-ENTMCNC: 32.2 G/DL (ref 33–36)
MCV RBC AUTO: 100.3 FL (ref 80–94)
MONOCYTES # BLD AUTO: 1.15 X10(3)/MCL (ref 0.1–1.3)
MONOCYTES NFR BLD AUTO: 11 %
NEUTROPHILS # BLD AUTO: 7.5 X10(3)/MCL (ref 2.1–9.2)
NEUTROPHILS NFR BLD AUTO: 71.9 %
NRBC BLD AUTO-RTO: 0 %
PLATELET # BLD AUTO: 122 X10(3)/MCL (ref 130–400)
PLATELETS.RETICULATED NFR BLD AUTO: 7.2 % (ref 0.9–11.2)
PMV BLD AUTO: 11.7 FL (ref 7.4–10.4)
POTASSIUM SERPL-SCNC: 3.7 MMOL/L (ref 3.5–5.1)
PROT SERPL-MCNC: 6.4 GM/DL (ref 5.8–7.6)
RBC # BLD AUTO: 3.72 X10(6)/MCL (ref 4.2–5.4)
SMOOTH MUSCLE AB SER QL IF: NEGATIVE
SODIUM SERPL-SCNC: 141 MMOL/L (ref 136–145)
WBC # SPEC AUTO: 10.43 X10(3)/MCL (ref 4.5–11.5)

## 2024-06-01 PROCEDURE — D9220A PRA ANESTHESIA: Mod: ANES,,, | Performed by: ANESTHESIOLOGY

## 2024-06-01 PROCEDURE — 25500020 PHARM REV CODE 255: Performed by: SURGERY

## 2024-06-01 PROCEDURE — 37000009 HC ANESTHESIA EA ADD 15 MINS: Performed by: SURGERY

## 2024-06-01 PROCEDURE — 63600175 PHARM REV CODE 636 W HCPCS

## 2024-06-01 PROCEDURE — 11000001 HC ACUTE MED/SURG PRIVATE ROOM

## 2024-06-01 PROCEDURE — D9220A PRA ANESTHESIA: Mod: CRNA,,,

## 2024-06-01 PROCEDURE — 63600175 PHARM REV CODE 636 W HCPCS: Performed by: ANESTHESIOLOGY

## 2024-06-01 PROCEDURE — 27201423 OPTIME MED/SURG SUP & DEVICES STERILE SUPPLY: Performed by: SURGERY

## 2024-06-01 PROCEDURE — 85025 COMPLETE CBC W/AUTO DIFF WBC: CPT | Performed by: INTERNAL MEDICINE

## 2024-06-01 PROCEDURE — 71000033 HC RECOVERY, INTIAL HOUR: Performed by: SURGERY

## 2024-06-01 PROCEDURE — 80053 COMPREHEN METABOLIC PANEL: CPT

## 2024-06-01 PROCEDURE — 47563 LAPARO CHOLECYSTECTOMY/GRAPH: CPT | Mod: ,,, | Performed by: SURGERY

## 2024-06-01 PROCEDURE — 25000003 PHARM REV CODE 250: Performed by: NURSE PRACTITIONER

## 2024-06-01 PROCEDURE — 63600175 PHARM REV CODE 636 W HCPCS: Performed by: STUDENT IN AN ORGANIZED HEALTH CARE EDUCATION/TRAINING PROGRAM

## 2024-06-01 PROCEDURE — G0378 HOSPITAL OBSERVATION PER HR: HCPCS

## 2024-06-01 PROCEDURE — 36000709 HC OR TIME LEV III EA ADD 15 MIN: Performed by: SURGERY

## 2024-06-01 PROCEDURE — 36000708 HC OR TIME LEV III 1ST 15 MIN: Performed by: SURGERY

## 2024-06-01 PROCEDURE — 25000003 PHARM REV CODE 250

## 2024-06-01 PROCEDURE — 37000008 HC ANESTHESIA 1ST 15 MINUTES: Performed by: SURGERY

## 2024-06-01 PROCEDURE — 96361 HYDRATE IV INFUSION ADD-ON: CPT

## 2024-06-01 PROCEDURE — 25000003 PHARM REV CODE 250: Performed by: SURGERY

## 2024-06-01 PROCEDURE — 36415 COLL VENOUS BLD VENIPUNCTURE: CPT | Performed by: INTERNAL MEDICINE

## 2024-06-01 RX ORDER — SODIUM CHLORIDE, SODIUM GLUCONATE, SODIUM ACETATE, POTASSIUM CHLORIDE AND MAGNESIUM CHLORIDE 30; 37; 368; 526; 502 MG/100ML; MG/100ML; MG/100ML; MG/100ML; MG/100ML
INJECTION, SOLUTION INTRAVENOUS CONTINUOUS
Status: CANCELLED | OUTPATIENT
Start: 2024-06-01 | End: 2024-07-01

## 2024-06-01 RX ORDER — ROCURONIUM BROMIDE 10 MG/ML
INJECTION, SOLUTION INTRAVENOUS
Status: DISCONTINUED | OUTPATIENT
Start: 2024-06-01 | End: 2024-06-01

## 2024-06-01 RX ORDER — DEXAMETHASONE SODIUM PHOSPHATE 4 MG/ML
INJECTION, SOLUTION INTRA-ARTICULAR; INTRALESIONAL; INTRAMUSCULAR; INTRAVENOUS; SOFT TISSUE
Status: DISCONTINUED | OUTPATIENT
Start: 2024-06-01 | End: 2024-06-01

## 2024-06-01 RX ORDER — HYDROMORPHONE HYDROCHLORIDE 2 MG/ML
0.2 INJECTION, SOLUTION INTRAMUSCULAR; INTRAVENOUS; SUBCUTANEOUS EVERY 5 MIN PRN
Status: DISCONTINUED | OUTPATIENT
Start: 2024-06-01 | End: 2024-06-01 | Stop reason: HOSPADM

## 2024-06-01 RX ORDER — CEFAZOLIN SODIUM 2 G/50ML
2 SOLUTION INTRAVENOUS
Status: COMPLETED | OUTPATIENT
Start: 2024-06-01 | End: 2024-06-01

## 2024-06-01 RX ORDER — PROPOFOL 10 MG/ML
VIAL (ML) INTRAVENOUS
Status: DISCONTINUED | OUTPATIENT
Start: 2024-06-01 | End: 2024-06-01

## 2024-06-01 RX ORDER — ACETAMINOPHEN 10 MG/ML
INJECTION, SOLUTION INTRAVENOUS
Status: DISCONTINUED | OUTPATIENT
Start: 2024-06-01 | End: 2024-06-01

## 2024-06-01 RX ORDER — MIDAZOLAM HYDROCHLORIDE 1 MG/ML
INJECTION INTRAMUSCULAR; INTRAVENOUS
Status: DISCONTINUED | OUTPATIENT
Start: 2024-06-01 | End: 2024-06-01

## 2024-06-01 RX ORDER — FENTANYL CITRATE 50 UG/ML
INJECTION, SOLUTION INTRAMUSCULAR; INTRAVENOUS
Status: DISCONTINUED | OUTPATIENT
Start: 2024-06-01 | End: 2024-06-01

## 2024-06-01 RX ORDER — DIPHENHYDRAMINE HYDROCHLORIDE 50 MG/ML
25 INJECTION INTRAMUSCULAR; INTRAVENOUS EVERY 6 HOURS PRN
Status: DISCONTINUED | OUTPATIENT
Start: 2024-06-01 | End: 2024-06-01 | Stop reason: HOSPADM

## 2024-06-01 RX ORDER — MIDAZOLAM HYDROCHLORIDE 2 MG/2ML
2 INJECTION, SOLUTION INTRAMUSCULAR; INTRAVENOUS ONCE AS NEEDED
Status: CANCELLED | OUTPATIENT
Start: 2024-06-01 | End: 2035-10-29

## 2024-06-01 RX ORDER — HEPARIN SODIUM 5000 [USP'U]/ML
5000 INJECTION, SOLUTION INTRAVENOUS; SUBCUTANEOUS
Status: COMPLETED | OUTPATIENT
Start: 2024-06-01 | End: 2024-06-01

## 2024-06-01 RX ORDER — BUPIVACAINE HYDROCHLORIDE AND EPINEPHRINE 5; 5 MG/ML; UG/ML
INJECTION, SOLUTION EPIDURAL; INTRACAUDAL; PERINEURAL
Status: DISCONTINUED | OUTPATIENT
Start: 2024-06-01 | End: 2024-06-01 | Stop reason: HOSPADM

## 2024-06-01 RX ORDER — ACETAMINOPHEN 10 MG/ML
15 INJECTION, SOLUTION INTRAVENOUS ONCE
Status: DISCONTINUED | OUTPATIENT
Start: 2024-06-01 | End: 2024-06-01 | Stop reason: HOSPADM

## 2024-06-01 RX ORDER — LIDOCAINE HYDROCHLORIDE 10 MG/ML
1 INJECTION, SOLUTION EPIDURAL; INFILTRATION; INTRACAUDAL; PERINEURAL ONCE
Status: CANCELLED | OUTPATIENT
Start: 2024-06-01 | End: 2024-06-01

## 2024-06-01 RX ORDER — ONDANSETRON HYDROCHLORIDE 2 MG/ML
4 INJECTION, SOLUTION INTRAVENOUS DAILY PRN
Status: DISCONTINUED | OUTPATIENT
Start: 2024-06-01 | End: 2024-06-01 | Stop reason: HOSPADM

## 2024-06-01 RX ADMIN — PROPOFOL 20 MG: 10 INJECTION, EMULSION INTRAVENOUS at 10:06

## 2024-06-01 RX ADMIN — ACETAMINOPHEN 1000 MG: 10 INJECTION, SOLUTION INTRAVENOUS at 08:06

## 2024-06-01 RX ADMIN — HEPARIN SODIUM 5000 UNITS: 5000 INJECTION, SOLUTION INTRAVENOUS; SUBCUTANEOUS at 08:06

## 2024-06-01 RX ADMIN — ROCURONIUM BROMIDE 20 MG: 10 SOLUTION INTRAVENOUS at 09:06

## 2024-06-01 RX ADMIN — SUGAMMADEX 100 MG: 100 INJECTION, SOLUTION INTRAVENOUS at 10:06

## 2024-06-01 RX ADMIN — MIDAZOLAM HYDROCHLORIDE 2 MG: 1 INJECTION, SOLUTION INTRAMUSCULAR; INTRAVENOUS at 08:06

## 2024-06-01 RX ADMIN — HYDROMORPHONE HYDROCHLORIDE 0.2 MG: 2 INJECTION INTRAMUSCULAR; INTRAVENOUS; SUBCUTANEOUS at 10:06

## 2024-06-01 RX ADMIN — DEXAMETHASONE SODIUM PHOSPHATE 8 MG: 4 INJECTION, SOLUTION INTRA-ARTICULAR; INTRALESIONAL; INTRAMUSCULAR; INTRAVENOUS; SOFT TISSUE at 08:06

## 2024-06-01 RX ADMIN — FENTANYL CITRATE 50 MCG: 50 INJECTION, SOLUTION INTRAMUSCULAR; INTRAVENOUS at 08:06

## 2024-06-01 RX ADMIN — SODIUM CHLORIDE: 9 INJECTION, SOLUTION INTRAVENOUS at 06:06

## 2024-06-01 RX ADMIN — CEFAZOLIN SODIUM 2 G: 2 SOLUTION INTRAVENOUS at 08:06

## 2024-06-01 RX ADMIN — SODIUM CHLORIDE, SODIUM GLUCONATE, SODIUM ACETATE, POTASSIUM CHLORIDE AND MAGNESIUM CHLORIDE: 526; 502; 368; 37; 30 INJECTION, SOLUTION INTRAVENOUS at 08:06

## 2024-06-01 RX ADMIN — PROPOFOL 100 MG: 10 INJECTION, EMULSION INTRAVENOUS at 08:06

## 2024-06-01 RX ADMIN — ROCURONIUM BROMIDE 50 MG: 10 SOLUTION INTRAVENOUS at 08:06

## 2024-06-01 NOTE — TRANSFER OF CARE
"Anesthesia Transfer of Care Note    Patient: Yana Rand    Procedure(s) Performed: Procedure(s) (LRB):  CHOLECYSTECTOMY, LAPAROSCOPIC (N/A)    Patient location: PACU    Anesthesia Type: general    Transport from OR: Transported from OR on room air with adequate spontaneous ventilation    Post pain: adequate analgesia    Post assessment: no apparent anesthetic complications    Post vital signs: stable    Level of consciousness: awake, alert and oriented    Nausea/Vomiting: no nausea/vomiting    Complications: none    Transfer of care protocol was followed      Last vitals: Visit Vitals  BP (!) 132/58 (BP Location: Right arm, Patient Position: Lying)   Pulse 78   Temp 36.7 °C (98.1 °F) (Oral)   Resp 20   Ht 5' 4" (1.626 m)   Wt 49.9 kg (110 lb 0.2 oz)   SpO2 100%   BMI 18.88 kg/m²     "

## 2024-06-01 NOTE — PROGRESS NOTES
[facility]  Hospital Medicine Progress Note        Chief Complaint: Inpatient Follow-up for elevated LFTs     HPI:   Yana Rand is a 63 y.o. female with a PMHx of anxiety who presented to [facility] on 5/29/2024 with c/o epigastric abdominal pain with associated constipation x 2 days.  She also endorsed nausea and vomiting.  She reportedly took MiraLax with some improvement with last BM on the morning of presentation..     Vital Signs upon presentation to the ED included /67, HR 70, RR 18, SpO2 100% on room air, temperature 97.7° F. Labs notable for CO2 20, glucose 140, , AST 1008, ALT 1038.  Acetaminophen level undetectable.  CT abdomen and pelvis with IV contrast demonstrates a distended gallbladder with numerous calcified gallstones.  GI consulted in ED. She received IV Zofran, IV morphine, Pepcid and IV fluids in the ED. Admitted to hospital medicine service for further medical management.    Seen by GI team and recommended cholecystectomy. Surgery team following patient and patient had a lap cholecystectomy done on 6/1/24    Interval Hx:   Patient just got back from lap rio. She is sedated. Has been afebrile.     Family at bedside, explained in detail about the patients condition, diagnosis, vitals, labs and treatment plan. They understand and agree with the plan. All their questions were answered.      Case was discussed with patient's nurse and  on the floor.    Objective/physical exam:  General: In no acute distress  Chest: Clear to auscultation bilaterally  Heart: RRR, +S1, S2, no appreciable murmur  Abdomen: Soft, nontender, BS +  Neurologic: Patient sedated     VITAL SIGNS: 24 HRS MIN & MAX LAST   Temp  Min: 97.7 °F (36.5 °C)  Max: 98.2 °F (36.8 °C) 98.1 °F (36.7 °C)   BP  Min: 118/53  Max: 159/84 (!) 140/68   Pulse  Min: 68  Max: 96  68   Resp  Min: 15  Max: 54 (!) 53   SpO2  Min: 98 %  Max: 100 % 98 %     I have reviewed the following labs:  Recent  Labs   Lab 05/30/24 0433 05/31/24 0444 06/01/24 0431   WBC 9.79 9.10 10.43   RBC 3.77* 3.61* 3.72*   HGB 11.9* 11.6* 12.0   HCT 36.9* 36.1* 37.3   MCV 97.9* 100.0* 100.3*   MCH 31.6* 32.1* 32.3*   MCHC 32.2* 32.1* 32.2*   RDW 11.9 11.8 11.9   * 111* 122*   MPV 12.2* 11.4* 11.7*     Recent Labs   Lab 05/30/24 0433 05/31/24 0444 06/01/24 0431    142 141   K 3.5 3.7 3.7   * 113* 112*   CO2 22* 18* 17*   BUN 12.4 20.1 16.7   CREATININE 0.69 0.71 0.72   CALCIUM 9.0 8.6 8.9   ALBUMIN 3.3* 3.2* 3.5   ALKPHOS 289* 241* 223*   * 553* 439*   * 198* 97*   BILITOT 2.0* 0.7 0.7     Microbiology Results (last 7 days)       ** No results found for the last 168 hours. **             See below for Radiology    Scheduled Med:   polyethylene glycol  17 g Oral Daily      Continuous Infusions:   sodium chloride 0.9%   Intravenous Continuous 75 mL/hr at 05/31/24 1934 New Bag at 05/31/24 1934      PRN Meds:    Current Facility-Administered Medications:     hydrALAZINE, 10 mg, Intravenous, Q6H PRN    morphine, 2 mg, Intravenous, Q6H PRN    ondansetron, 4 mg, Intravenous, Q4H PRN    promethazine, 25 mg, Intramuscular, Q6H PRN     Assessment/Plan:  Symptomatic Cholelithiasis     History:  Anxiety    Plan:  Patient s/p lap cholecystectomy   Sedated but comfortable.   Labs in am     Continue supportive care     Diet per surgery team        VTE prophylaxis: SCD    Patient condition:  Fair    Anticipated discharge and Disposition:   Home when cleared by Surgery team       All diagnosis and differential diagnosis have been reviewed; assessment and plan has been documented; I have personally reviewed the labs and test results that are presently available; I have reviewed the patients medication list; I have reviewed the consulting providers response and recommendations. I have reviewed or attempted to review medical records based upon their availability    All of the patient's questions have been  addressed  and answered. Patient's is agreeable to the above stated plan. I will continue to monitor closely and make adjustments to medical management as needed.  _____________________________________________________________________    Nutrition Status:    Radiology:  I have personally reviewed the following imaging and agree with the radiologist.     SURG FL Surgery Fluoro Usage  See OP Notes for results.     IMPRESSION: See OP Notes for results.     This procedure was auto-finalized by: Virtual Radiologist      Catracho Jin MD  Department of Hospital Medicine   [facility]   06/01/2024

## 2024-06-01 NOTE — PLAN OF CARE
Problem: Adult Inpatient Plan of Care  Goal: Plan of Care Review  Outcome: Progressing  Goal: Patient-Specific Goal (Individualized)  Outcome: Progressing  Goal: Absence of Hospital-Acquired Illness or Injury  Outcome: Progressing  Goal: Optimal Comfort and Wellbeing  Outcome: Progressing  Goal: Readiness for Transition of Care  Outcome: Progressing     Problem: Pain Acute  Goal: Optimal Pain Control and Function  Outcome: Progressing     Problem: Nausea and Vomiting  Goal: Nausea and Vomiting Relief  Outcome: Progressing     Problem: Wound  Goal: Optimal Coping  Outcome: Progressing  Goal: Optimal Functional Ability  Outcome: Progressing  Goal: Absence of Infection Signs and Symptoms  Outcome: Progressing  Goal: Improved Oral Intake  Outcome: Progressing  Goal: Optimal Pain Control and Function  Outcome: Progressing  Goal: Skin Health and Integrity  Outcome: Progressing  Goal: Optimal Wound Healing  Outcome: Progressing

## 2024-06-01 NOTE — ANESTHESIA PROCEDURE NOTES
Intubation    Date/Time: 6/1/2024 8:39 AM    Performed by: Lara Medley CRNA  Authorized by: Kevin Sarkar MD    Intubation:     Induction:  Intravenous    Intubated:  Postinduction    Mask Ventilation:  Easy mask    Attempts:  1    Attempted By:  CRNA    Method of Intubation:  Direct    Blade:  Ceci 3    Laryngeal View Grade: Grade IIA - cords partially seen      Difficult Airway Encountered?: No      Complications:  None    Airway Device:  Oral endotracheal tube    Airway Device Size:  7.0    Style/Cuff Inflation:  Cuffed (inflated to minimal occlusive pressure)    Inflation Amount (mL):  6    Tube secured:  21    Secured at:  The teeth    Placement Verified By:  Capnometry    Complicating Factors:  None    Findings Post-Intubation:  BS equal bilateral

## 2024-06-01 NOTE — PROGRESS NOTES
"   Acute Care Surgery   Progress Note  Admit Date: 5/29/2024  HD#3  POD#Day of Surgery    Subjective:   Interval history:  Pain controlled  NPO, denies nausea or vomiting  +BM/flatus, voiding  OOB    Home Meds:  Current Outpatient Medications   Medication Instructions    ALPRAZolam (XANAX) 0.25 mg, Oral, 2 times daily    estrogens (conjugated) (PREMARIN) 0.625 mg, Oral, Daily    polyethylene glycol (GLYCOLAX) 17 g, Oral, As needed (PRN)      Scheduled Meds:   polyethylene glycol  17 g Oral Daily     Continuous Infusions:   sodium chloride 0.9%   Intravenous Continuous 75 mL/hr at 05/31/24 1934 New Bag at 05/31/24 1934     PRN Meds:  Current Facility-Administered Medications:     hydrALAZINE, 10 mg, Intravenous, Q6H PRN    morphine, 2 mg, Intravenous, Q6H PRN    ondansetron, 4 mg, Intravenous, Q4H PRN    promethazine, 25 mg, Intramuscular, Q6H PRN     Objective:     VITAL SIGNS: 24 HR MIN & MAX LAST   Temp  Min: 97.7 °F (36.5 °C)  Max: 98.2 °F (36.8 °C)  98 °F (36.7 °C)   BP  Min: 118/53  Max: 156/70  (!) 156/70    Pulse  Min: 74  Max: 96  85    Resp  Min: 18  Max: 18  18    SpO2  Min: 99 %  Max: 100 %  100 %      HT: 5' 4" (162.6 cm)  WT: 49.9 kg (110 lb 0.2 oz)  BMI: 18.9     Intake/output:  Intake/Output - Last 3 Shifts         05/30 0700 05/31 0659 05/31 0700 06/01 0659 06/01 0700 06/02 0659    P.O. 720 480     Total Intake(mL/kg) 720 (14.4) 480 (9.6)     Net +720 +480            Urine Occurrence 4 x 2 x     Stool Occurrence 0 x 0 x             Intake/Output Summary (Last 24 hours) at 6/1/2024 0707  Last data filed at 5/31/2024 2212  Gross per 24 hour   Intake 480 ml   Output --   Net 480 ml           Lines/drains/airway:       Peripheral IV - Single Lumen 05/29/24 1305 20 G Left Antecubital (Active)   Site Assessment Clean;Dry;Intact;No redness;No swelling 06/01/24 0318   Extremity Assessment Distal to IV No abnormal discoloration;No redness;No swelling 05/29/24 1305   Line Status Infusing 06/01/24 0318 " "  Dressing Status Clean;Dry;Intact 06/01/24 0318   Dressing Intervention Integrity maintained 06/01/24 0318   Number of days: 2       Physical examination:  Gen: NAD  CV: RR  Resp: NWOB  Abd: S, mild tenderness to palpation RUQ, ND, no guarding or rigidity   Ext: moving all extremities spontaneously and purposefully  Neuro: alert    Labs:  Renal:  Recent Labs     05/29/24 1200 05/30/24  0433 05/31/24  0444 06/01/24  0431   BUN 12.6 12.4 20.1 16.7   CREATININE 0.78 0.69 0.71 0.72     No results for input(s): "LACTIC" in the last 72 hours.  FENGI:  Recent Labs     05/29/24 1200 05/30/24 0433 05/31/24 0444 06/01/24 0431    140 142 141   K 3.7 3.5 3.7 3.7    111* 113* 112*   CO2 20* 22* 18* 17*   CALCIUM 9.9 9.0 8.6 8.9   ALBUMIN 4.2 3.3* 3.2* 3.5   BILITOT 1.4 2.0* 0.7 0.7   AST 1,008* 606* 198* 97*   ALKPHOS 320* 289* 241* 223*   ALT 1,038* 873* 553* 439*     Heme:  Recent Labs     05/29/24 1200 05/29/24  1305 05/30/24 0433 05/31/24 0444 06/01/24 0431   HGB 13.4  --  11.9* 11.6* 12.0   HCT 39.7  --  36.9* 36.1* 37.3     --  117* 111* 122*   INR  --  1.0 1.1 1.2  --      ID:  Recent Labs     05/29/24 1200 05/30/24  0433 05/31/24  0444 06/01/24  0431   WBC 11.05 9.79 9.10 10.43     CBG:  Recent Labs     05/29/24 1200 05/30/24  0433 05/31/24  0444 06/01/24  0431   GLUCOSE 140* 96 70* 83      Cardiovascular:  No results for input(s): "TROPONINI", "CKTOTAL", "CKMB", "BNP" in the last 168 hours.  I have reviewed all pertinent lab results within the past 24 hours.    Imaging:  US Abdomen Limited_Liver   Final Result      1. Cholelithiasis with distended gallbladder but negative sonographic Rose sign.  If there remains clinical suspicion of acute cholecystitis would recommend correlation with nuclear medicine HIDA scan.   2. Mild extrahepatic biliary ductal dilatation.         Electronically signed by: David Guzmán   Date:    05/29/2024   Time:    16:22      CT Abdomen Pelvis With IV Contrast " NO Oral Contrast   Final Result      Distended gallbladder with numerous calcified gallstones.  Right upper quadrant ultrasound is available for correlation.         Electronically signed by: Krista Pal   Date:    05/29/2024   Time:    13:47      MRI MRCP Without Contrast    (Results Pending)      I have reviewed all pertinent imaging results/findings within the past 24 hours.    Micro/Path/Other:  Microbiology Results (last 7 days)       ** No results found for the last 168 hours. **           Pathology Results  (Last 7 days)      None             Assessment & Plan:   65 y/o F without significant PMHx who presented to [facility] ED on 5/29 with a 3 day hx of RUQ abdominal pain and nausea with radiologic findings of cholelithiasis and with elevated LFTs. Clinical presentation most consistent with cholelithiasis in the setting of possible recent choledocholithiasis.   -LFTs continue down trending suspect patient passed a stone. Will proceed to OR for lap rio today  -NPO preop, ok for diet after  -pain meds as needed  -encourage OOB  -perioperative heparin and ancef ordered  -recommend DVT ppx post op    Jonathan Dueñas MD  [employer] General Surgery PGY-4

## 2024-06-02 VITALS
WEIGHT: 110 LBS | OXYGEN SATURATION: 99 % | TEMPERATURE: 98 F | RESPIRATION RATE: 18 BRPM | DIASTOLIC BLOOD PRESSURE: 70 MMHG | HEART RATE: 94 BPM | HEIGHT: 64 IN | BODY MASS INDEX: 18.78 KG/M2 | SYSTOLIC BLOOD PRESSURE: 143 MMHG

## 2024-06-02 PROBLEM — K80.11: Status: ACTIVE | Noted: 2024-06-02

## 2024-06-02 LAB
ALBUMIN SERPL-MCNC: 3.4 G/DL (ref 3.4–4.8)
ALBUMIN/GLOB SERPL: 1.1 RATIO (ref 1.1–2)
ALP SERPL-CCNC: 192 UNIT/L (ref 40–150)
ALT SERPL-CCNC: 341 UNIT/L (ref 0–55)
ANION GAP SERPL CALC-SCNC: 11 MEQ/L
AST SERPL-CCNC: 97 UNIT/L (ref 5–34)
BASOPHILS # BLD AUTO: 0.02 X10(3)/MCL
BASOPHILS NFR BLD AUTO: 0.1 %
BILIRUB SERPL-MCNC: 0.5 MG/DL
BUN SERPL-MCNC: 17.6 MG/DL (ref 9.8–20.1)
CALCIUM SERPL-MCNC: 9.1 MG/DL (ref 8.4–10.2)
CHLORIDE SERPL-SCNC: 111 MMOL/L (ref 98–107)
CO2 SERPL-SCNC: 22 MMOL/L (ref 23–31)
CREAT SERPL-MCNC: 0.69 MG/DL (ref 0.55–1.02)
CREAT/UREA NIT SERPL: 26
EOSINOPHIL # BLD AUTO: 0 X10(3)/MCL (ref 0–0.9)
EOSINOPHIL NFR BLD AUTO: 0 %
ERYTHROCYTE [DISTWIDTH] IN BLOOD BY AUTOMATED COUNT: 12 % (ref 11.5–17)
GFR SERPLBLD CREATININE-BSD FMLA CKD-EPI: >60 ML/MIN/1.73/M2
GLOBULIN SER-MCNC: 3 GM/DL (ref 2.4–3.5)
GLUCOSE SERPL-MCNC: 118 MG/DL (ref 82–115)
HCT VFR BLD AUTO: 35.4 % (ref 37–47)
HGB BLD-MCNC: 11.7 G/DL (ref 12–16)
IMM GRANULOCYTES # BLD AUTO: 0.09 X10(3)/MCL (ref 0–0.04)
IMM GRANULOCYTES NFR BLD AUTO: 0.5 %
LYMPHOCYTES # BLD AUTO: 0.97 X10(3)/MCL (ref 0.6–4.6)
LYMPHOCYTES NFR BLD AUTO: 5.6 %
MCH RBC QN AUTO: 32.2 PG (ref 27–31)
MCHC RBC AUTO-ENTMCNC: 33.1 G/DL (ref 33–36)
MCV RBC AUTO: 97.5 FL (ref 80–94)
MONOCYTES # BLD AUTO: 1.72 X10(3)/MCL (ref 0.1–1.3)
MONOCYTES NFR BLD AUTO: 9.9 %
NEUTROPHILS # BLD AUTO: 14.57 X10(3)/MCL (ref 2.1–9.2)
NEUTROPHILS NFR BLD AUTO: 83.9 %
NRBC BLD AUTO-RTO: 0 %
PLATELET # BLD AUTO: 172 X10(3)/MCL (ref 130–400)
PMV BLD AUTO: 13 FL (ref 7.4–10.4)
POTASSIUM SERPL-SCNC: 3.8 MMOL/L (ref 3.5–5.1)
PROT SERPL-MCNC: 6.4 GM/DL (ref 5.8–7.6)
RBC # BLD AUTO: 3.63 X10(6)/MCL (ref 4.2–5.4)
SODIUM SERPL-SCNC: 144 MMOL/L (ref 136–145)
WBC # SPEC AUTO: 17.37 X10(3)/MCL (ref 4.5–11.5)

## 2024-06-02 PROCEDURE — 96361 HYDRATE IV INFUSION ADD-ON: CPT

## 2024-06-02 PROCEDURE — 36415 COLL VENOUS BLD VENIPUNCTURE: CPT | Performed by: INTERNAL MEDICINE

## 2024-06-02 PROCEDURE — G0378 HOSPITAL OBSERVATION PER HR: HCPCS

## 2024-06-02 PROCEDURE — 25000003 PHARM REV CODE 250: Performed by: NURSE PRACTITIONER

## 2024-06-02 PROCEDURE — 85025 COMPLETE CBC W/AUTO DIFF WBC: CPT | Performed by: INTERNAL MEDICINE

## 2024-06-02 PROCEDURE — 80053 COMPREHEN METABOLIC PANEL: CPT | Performed by: INTERNAL MEDICINE

## 2024-06-02 PROCEDURE — 25000003 PHARM REV CODE 250: Performed by: INTERNAL MEDICINE

## 2024-06-02 RX ORDER — DOCUSATE SODIUM 100 MG/1
100 CAPSULE, LIQUID FILLED ORAL 2 TIMES DAILY
Status: DISCONTINUED | OUTPATIENT
Start: 2024-06-02 | End: 2024-06-02 | Stop reason: HOSPADM

## 2024-06-02 RX ORDER — OXYCODONE AND ACETAMINOPHEN 10; 325 MG/1; MG/1
1 TABLET ORAL EVERY 4 HOURS PRN
Qty: 15 TABLET | Refills: 0 | Status: ON HOLD | OUTPATIENT
Start: 2024-06-02 | End: 2024-06-13

## 2024-06-02 RX ORDER — OXYCODONE AND ACETAMINOPHEN 10; 325 MG/1; MG/1
1 TABLET ORAL EVERY 4 HOURS PRN
Status: DISCONTINUED | OUTPATIENT
Start: 2024-06-02 | End: 2024-06-02 | Stop reason: HOSPADM

## 2024-06-02 RX ORDER — LISINOPRIL 10 MG/1
10 TABLET ORAL DAILY
Status: DISCONTINUED | OUTPATIENT
Start: 2024-06-02 | End: 2024-06-02 | Stop reason: HOSPADM

## 2024-06-02 RX ORDER — LISINOPRIL 10 MG/1
10 TABLET ORAL DAILY
Qty: 90 TABLET | Refills: 3 | Status: SHIPPED | OUTPATIENT
Start: 2024-06-03 | End: 2025-06-03

## 2024-06-02 RX ORDER — DOCUSATE SODIUM 100 MG/1
100 CAPSULE, LIQUID FILLED ORAL 2 TIMES DAILY
Status: ON HOLD
Start: 2024-06-02 | End: 2024-06-13

## 2024-06-02 RX ADMIN — DOCUSATE SODIUM 100 MG: 100 CAPSULE, LIQUID FILLED ORAL at 09:06

## 2024-06-02 RX ADMIN — LISINOPRIL 10 MG: 10 TABLET ORAL at 08:06

## 2024-06-02 RX ADMIN — POLYETHYLENE GLYCOL 3350 17 G: 17 POWDER, FOR SOLUTION ORAL at 08:06

## 2024-06-02 RX ADMIN — SODIUM CHLORIDE: 9 INJECTION, SOLUTION INTRAVENOUS at 06:06

## 2024-06-02 NOTE — PLAN OF CARE
Problem: Adult Inpatient Plan of Care  Goal: Plan of Care Review  Outcome: Met  Goal: Patient-Specific Goal (Individualized)  Outcome: Met  Goal: Absence of Hospital-Acquired Illness or Injury  Outcome: Met  Goal: Optimal Comfort and Wellbeing  Outcome: Met  Goal: Readiness for Transition of Care  Outcome: Met     Problem: Pain Acute  Goal: Optimal Pain Control and Function  Outcome: Met     Problem: Nausea and Vomiting  Goal: Nausea and Vomiting Relief  Outcome: Met     Problem: Wound  Goal: Optimal Coping  Outcome: Met  Goal: Optimal Functional Ability  Outcome: Met  Goal: Absence of Infection Signs and Symptoms  Outcome: Met  Goal: Improved Oral Intake  Outcome: Met  Goal: Optimal Pain Control and Function  Outcome: Met  Goal: Skin Health and Integrity  Outcome: Met  Goal: Optimal Wound Healing  Outcome: Met

## 2024-06-02 NOTE — DISCHARGE SUMMARY
[facility] Hospital Medicine Discharge Summary    Admit Date: 5/29/2024  Discharge Date and Time: 6/2/20249:59 AM  Admitting Physician: DIMA Team  Discharging Physician: Catracho Jin MD.  Primary Care Physician: Niall Lambert MD  Consults: General Surgery    Discharge Diagnoses:  Symptomatic Cholelithiasis s/p Cholecystectomy      History:  Anxiety    Hospital Course:   Yana Rand is a 63 y.o. female with a PMHx of anxiety who presented to [facility] on 5/29/2024 with c/o epigastric abdominal pain with associated constipation x 2 days.  She also endorsed nausea and vomiting.  She reportedly took MiraLax with some improvement with last BM on the morning of presentation..     Vital Signs upon presentation to the ED included /67, HR 70, RR 18, SpO2 100% on room air, temperature 97.7° F. Labs notable for CO2 20, glucose 140, , AST 1008, ALT 1038.  Acetaminophen level undetectable.  CT abdomen and pelvis with IV contrast demonstrates a distended gallbladder with numerous calcified gallstones.  GI consulted in ED. She received IV Zofran, IV morphine, Pepcid and IV fluids in the ED. Admitted to hospital medicine service for further medical management.     Seen by GI team and recommended cholecystectomy. Surgery team following patient and patient had a lap cholecystectomy done on 6/1/24. Patient had no post operative issues. She was eating well and passing flatus. She was cleared by surgery team and was discharged home in a stable condition. BP was mildly elevated during this stay. She was started on Lisinopril 10 mg po daily. Advised f/u with her PCP.     Pt was seen and examined on the day of discharge  Vitals:  VITAL SIGNS: 24 HRS MIN & MAX LAST   Temp  Min: 97.7 °F (36.5 °C)  Max: 98.6 °F (37 °C) 98.3 °F (36.8 °C)   BP  Min: 129/62  Max: 155/65 (!) 143/70   Pulse  Min: 68  Max: 96  94   Resp  Min: 15  Max: 54 18   SpO2  Min: 97 %  Max: 100 % 99 %       Physical  Exam:  .mn    Procedures Performed: No admission procedures for hospital encounter.     Significant Diagnostic Studies: See Full reports for all details    Recent Labs   Lab 05/31/24 0444 06/01/24 0431 06/02/24  0713   WBC 9.10 10.43 17.37*   RBC 3.61* 3.72* 3.63*   HGB 11.6* 12.0 11.7*   HCT 36.1* 37.3 35.4*   .0* 100.3* 97.5*   MCH 32.1* 32.3* 32.2*   MCHC 32.1* 32.2* 33.1   RDW 11.8 11.9 12.0   * 122* 172   MPV 11.4* 11.7* 13.0*       Recent Labs   Lab 05/31/24 0444 06/01/24 0431 06/02/24  0713    141 144   K 3.7 3.7 3.8   * 112* 111*   CO2 18* 17* 22*   BUN 20.1 16.7 17.6   CREATININE 0.71 0.72 0.69   CALCIUM 8.6 8.9 9.1   ALBUMIN 3.2* 3.5 3.4   ALKPHOS 241* 223* 192*   * 439* 341*   * 97* 97*   BILITOT 0.7 0.7 0.5        Microbiology Results (last 7 days)       ** No results found for the last 168 hours. **             SURG FL Surgery Fluoro Usage  See OP Notes for results.     IMPRESSION: See OP Notes for results.     This procedure was auto-finalized by: Virtual Radiologist         Medication List        START taking these medications      docusate sodium 100 MG capsule  Commonly known as: COLACE  Take 1 capsule (100 mg total) by mouth 2 (two) times daily.     lisinopriL 10 MG tablet  Take 1 tablet (10 mg total) by mouth once daily.  Start taking on: Jojo 3, 2024     oxyCODONE-acetaminophen  mg per tablet  Commonly known as: PERCOCET  Take 1 tablet by mouth every 4 (four) hours as needed for Pain.            CONTINUE taking these medications      ALPRAZolam 0.25 MG tablet  Commonly known as: XANAX     estrogens (conjugated) 0.625 MG tablet  Commonly known as: PREMARIN     polyethylene glycol 17 gram/dose powder  Commonly known as: GLYCOLAX               Where to Get Your Medications        You can get these medications from any pharmacy    Bring a paper prescription for each of these medications  lisinopriL 10 MG tablet  oxyCODONE-acetaminophen  mg per  tablet       Information about where to get these medications is not yet available    Ask your nurse or doctor about these medications  docusate sodium 100 MG capsule          Explained in detail to the patient about the discharge plan, medications, and follow-up visits. Pt understands and agrees with the treatment plan  Discharge Disposition:   Home   Discharged Condition: stable  Diet-   Dietary Orders (From admission, onward)       Start     Ordered    06/02/24 0231  Diet Adult Regular  Diet effective now         06/02/24 0230                   Medications Per DC med rec  Activities as tolerated   Follow-up Information       iNall Lambert MD Follow up in 2 week(s).    Specialty: Internal Medicine  Contact information:  [address] [place] [address] [address] [place] [address]  [phone]                           For further questions contact hospitalist office    Discharge time 33 minutes    For worsening symptoms, chest pain, shortness of breath, increased abdominal pain, high grade fever, stroke or stroke like symptoms, immediately go to the nearest Emergency Room or call 911 as soon as possible.      Catracho Soliz M.D on 6/2/2024. at 9:59 AM.

## 2024-06-02 NOTE — ANESTHESIA POSTPROCEDURE EVALUATION
Anesthesia Post Evaluation    Patient: Yana Rand    Procedure(s) Performed: Procedure(s) (LRB):  CHOLECYSTECTOMY, LAPAROSCOPIC (N/A)    Final Anesthesia Type: general      Patient location during evaluation: PACU  Patient participation: Yes- Able to Participate  Level of consciousness: awake and alert  Post-procedure vital signs: reviewed and stable  Pain management: adequate  Airway patency: patent      Anesthetic complications: no      Cardiovascular status: blood pressure returned to baseline  Respiratory status: unassisted  Hydration status: euvolemic  Follow-up not needed.              Vitals Value Taken Time   /70 06/02/24 0724   Temp 36.8 °C (98.3 °F) 06/02/24 0724   Pulse 94 06/02/24 0724   Resp 18 06/02/24 0724   SpO2 99 % 06/02/24 0724         Event Time   Out of Recovery 06/01/2024 11:15:00         Pain/Tristen Score: Pain Rating Prior to Med Admin: 4 (6/1/2024 10:50 AM)  Pain Rating Post Med Admin: 4 (6/1/2024 11:15 AM)  Tristen Score: 10 (6/1/2024 11:15 AM)

## 2024-06-02 NOTE — PROGRESS NOTES
"   Acute Care Surgery   Progress Note  Admit Date: 5/29/2024  HD#4  POD#1 Day Post-Op    Subjective:   Interval history:  Reports pain from admission has resolved and she now has expected postoperative soreness  Tolerating liquid diet without nausea or vomiting  +flatus, voiding  OOB    Home Meds:  Current Outpatient Medications   Medication Instructions    ALPRAZolam (XANAX) 0.25 mg, Oral, 2 times daily    estrogens (conjugated) (PREMARIN) 0.625 mg, Oral, Daily    polyethylene glycol (GLYCOLAX) 17 g, Oral, As needed (PRN)      Scheduled Meds:   polyethylene glycol  17 g Oral Daily     Continuous Infusions:   sodium chloride 0.9%   Intravenous Continuous 75 mL/hr at 06/02/24 0633 New Bag at 06/02/24 0633     PRN Meds:  Current Facility-Administered Medications:     hydrALAZINE, 10 mg, Intravenous, Q6H PRN    morphine, 2 mg, Intravenous, Q6H PRN    ondansetron, 4 mg, Intravenous, Q4H PRN    promethazine, 25 mg, Intramuscular, Q6H PRN     Objective:     VITAL SIGNS: 24 HR MIN & MAX LAST   Temp  Min: 97.7 °F (36.5 °C)  Max: 98.6 °F (37 °C)  98.6 °F (37 °C)   BP  Min: 125/88  Max: 159/84  (!) 145/68    Pulse  Min: 68  Max: 96  96    Resp  Min: 15  Max: 54  18    SpO2  Min: 97 %  Max: 100 %  99 %      HT: 5' 4" (162.6 cm)  WT: 49.9 kg (110 lb 0.2 oz)  BMI: 18.9     Intake/output:  Intake/Output - Last 3 Shifts         05/31 0700  06/01 0659 06/01 0700  06/02 0659 06/02 0700  06/03 0659    P.O. 480 200     IV Piggyback  1000     Total Intake(mL/kg) 480 (9.6) 1200 (24)     Urine (mL/kg/hr)  300 (0.3)     Blood  100     Total Output  400     Net +480 +800            Urine Occurrence 2 x 2 x     Stool Occurrence 0 x              Intake/Output Summary (Last 24 hours) at 6/2/2024 0708  Last data filed at 6/1/2024 1834  Gross per 24 hour   Intake 1200 ml   Output 400 ml   Net 800 ml           Lines/drains/airway:       Peripheral IV - Single Lumen 05/29/24 1305 20 G Left Antecubital (Active)   Site Assessment " "Clean;Dry;Intact;No redness;No swelling 06/02/24 0332   Extremity Assessment Distal to IV No abnormal discoloration;No redness;No swelling 06/01/24 1032   Line Status Infusing 06/02/24 0332   Dressing Status Clean;Dry;Intact 06/02/24 0332   Dressing Intervention Integrity maintained 06/02/24 0332   Number of days: 3       Physical examination:  Gen: NAD  CV: RR  Resp: NWOB  Abd: S, appropriately TTP, minimally distended, incisions CDI   Ext: moving all extremities spontaneously and purposefully  Neuro: alert    Labs:  Renal:  Recent Labs     05/31/24 0444 06/01/24 0431   BUN 20.1 16.7   CREATININE 0.71 0.72     No results for input(s): "LACTIC" in the last 72 hours.  FENGI:  Recent Labs     05/31/24 0444 06/01/24 0431    141   K 3.7 3.7   * 112*   CO2 18* 17*   CALCIUM 8.6 8.9   ALBUMIN 3.2* 3.5   BILITOT 0.7 0.7   * 97*   ALKPHOS 241* 223*   * 439*     Heme:  Recent Labs     05/31/24 0444 06/01/24 0431   HGB 11.6* 12.0   HCT 36.1* 37.3   * 122*   INR 1.2  --      ID:  Recent Labs     05/31/24 0444 06/01/24 0431   WBC 9.10 10.43     CBG:  Recent Labs     05/31/24 0444 06/01/24 0431   GLUCOSE 70* 83      Cardiovascular:  No results for input(s): "TROPONINI", "CKTOTAL", "CKMB", "BNP" in the last 168 hours.  I have reviewed all pertinent lab results within the past 24 hours.    Imaging:  SURG FL Surgery Fluoro Usage   Final Result      US Abdomen Limited_Liver   Final Result      1. Cholelithiasis with distended gallbladder but negative sonographic Rose sign.  If there remains clinical suspicion of acute cholecystitis would recommend correlation with nuclear medicine HIDA scan.   2. Mild extrahepatic biliary ductal dilatation.         Electronically signed by: David Guzmán   Date:    05/29/2024   Time:    16:22      CT Abdomen Pelvis With IV Contrast NO Oral Contrast   Final Result      Distended gallbladder with numerous calcified gallstones.  Right upper quadrant " ultrasound is available for correlation.         Electronically signed by: Krista Pal   Date:    05/29/2024   Time:    13:47         I have reviewed all pertinent imaging results/findings within the past 24 hours.    Micro/Path/Other:  Microbiology Results (last 7 days)       ** No results found for the last 168 hours. **           Pathology Results  (Last 7 days)      None             Assessment & Plan:   65 y/o F without significant PMHx who presented to [facility] ED on 5/29 with a 3 day hx of RUQ abdominal pain and nausea with radiologic findings of cholelithiasis and with elevated LFTs. Clinical presentation most consistent with cholelithiasis in the setting of possible recent choledocholithiasis now s/p lap rio 6/1  -afebrile, HDS  -Patient reports pain from admission has resolved she now has expected postoperative soreness.   -diet as tolerated  -MMPC  -encourage OOB  -recommend DVT ppx lovenox  -Wound care instructions and return precautions provided    From our perspective the patient can be discharged home.  messaged for follow up which should be scheduled in the next 1-2 weeks.     Please call with any questions or concerns.     Jonathan Dueñas MD  [employer] General Surgery PGY-4

## 2024-06-04 LAB — PSYCHE PATHOLOGY RESULT: NORMAL

## 2024-06-05 ENCOUNTER — TELEPHONE (OUTPATIENT)
Dept: SURGERY | Facility: CLINIC | Age: 64
End: 2024-06-05
Payer: COMMERCIAL

## 2024-06-05 NOTE — TELEPHONE ENCOUNTER
----- Message from Ashly Harrison RN sent at 6/5/2024 10:39 AM CDT -----  Patient needs post op call on 6/18. Thanks  ----- Message -----  From: Evin Mcdermott MD  Sent: 6/2/2024  11:52 PM CDT  To: SILVESTRE Narayanan,    Can we schedule this patient a 2-week phone f/u?    Thank you  
21.5

## 2024-06-06 ENCOUNTER — PATIENT OUTREACH (OUTPATIENT)
Dept: ADMINISTRATIVE | Facility: CLINIC | Age: 64
End: 2024-06-06
Payer: COMMERCIAL

## 2024-06-06 NOTE — PROGRESS NOTES
C3 nurse spoke with Yana Rand's , Chemo for a TCC post hospital discharge follow up call. The patient does not have a scheduled HOSFU appointment with Niall Lambert MD within 5-7 days post hospital discharge date 6/2/24. C3 nurse was unable to schedule HOSFU appointment in Epic or route message to PCP.  advised to call and schedule appt within 5-7 days of discharge.

## 2024-06-08 ENCOUNTER — HOSPITAL ENCOUNTER (INPATIENT)
Facility: HOSPITAL | Age: 64
LOS: 4 days | Discharge: HOME | DRG: 394 | End: 2024-06-13
Attending: EMERGENCY MEDICINE | Admitting: STUDENT IN AN ORGANIZED HEALTH CARE EDUCATION/TRAINING PROGRAM
Payer: COMMERCIAL

## 2024-06-08 DIAGNOSIS — R10.11 RIGHT UPPER QUADRANT PAIN: ICD-10-CM

## 2024-06-08 DIAGNOSIS — K80.45 CALCULUS OF BILE DUCT WITH CHRONIC CHOLECYSTITIS WITH OBSTRUCTION: Primary | ICD-10-CM

## 2024-06-08 DIAGNOSIS — K83.9 BILE LEAK: ICD-10-CM

## 2024-06-08 DIAGNOSIS — R18.8 INTRA-ABDOMINAL FLUID COLLECTION: ICD-10-CM

## 2024-06-08 PROCEDURE — 99285 EMERGENCY DEPT VISIT HI MDM: CPT

## 2024-06-09 LAB
ALBUMIN SERPL-MCNC: 3.4 G/DL (ref 3.4–4.8)
ALBUMIN/GLOB SERPL: 1 RATIO (ref 1.1–2)
ALP SERPL-CCNC: 125 UNIT/L (ref 40–150)
ALT SERPL-CCNC: 72 UNIT/L (ref 0–55)
ANION GAP SERPL CALC-SCNC: 11 MEQ/L
AST SERPL-CCNC: 32 UNIT/L (ref 5–34)
BASOPHILS # BLD AUTO: 0.03 X10(3)/MCL
BASOPHILS NFR BLD AUTO: 0.2 %
BILIRUB SERPL-MCNC: 0.4 MG/DL
BUN SERPL-MCNC: 11 MG/DL (ref 9.8–20.1)
CALCIUM SERPL-MCNC: 9.7 MG/DL (ref 8.4–10.2)
CHLORIDE SERPL-SCNC: 104 MMOL/L (ref 98–107)
CO2 SERPL-SCNC: 24 MMOL/L (ref 23–31)
CREAT SERPL-MCNC: 0.75 MG/DL (ref 0.55–1.02)
CREAT/UREA NIT SERPL: 15
EOSINOPHIL # BLD AUTO: 0 X10(3)/MCL (ref 0–0.9)
EOSINOPHIL NFR BLD AUTO: 0 %
ERYTHROCYTE [DISTWIDTH] IN BLOOD BY AUTOMATED COUNT: 11.9 % (ref 11.5–17)
GFR SERPLBLD CREATININE-BSD FMLA CKD-EPI: >60 ML/MIN/1.73/M2
GLOBULIN SER-MCNC: 3.5 GM/DL (ref 2.4–3.5)
GLUCOSE SERPL-MCNC: 135 MG/DL (ref 82–115)
HCT VFR BLD AUTO: 35.1 % (ref 37–47)
HGB BLD-MCNC: 11.2 G/DL (ref 12–16)
IMM GRANULOCYTES # BLD AUTO: 0.16 X10(3)/MCL (ref 0–0.04)
IMM GRANULOCYTES NFR BLD AUTO: 0.9 %
LYMPHOCYTES # BLD AUTO: 0.59 X10(3)/MCL (ref 0.6–4.6)
LYMPHOCYTES NFR BLD AUTO: 3.4 %
MAGNESIUM SERPL-MCNC: 2.1 MG/DL (ref 1.6–2.6)
MCH RBC QN AUTO: 32.4 PG (ref 27–31)
MCHC RBC AUTO-ENTMCNC: 31.9 G/DL (ref 33–36)
MCV RBC AUTO: 101.4 FL (ref 80–94)
MONOCYTES # BLD AUTO: 1.11 X10(3)/MCL (ref 0.1–1.3)
MONOCYTES NFR BLD AUTO: 6.5 %
NEUTROPHILS # BLD AUTO: 15.27 X10(3)/MCL (ref 2.1–9.2)
NEUTROPHILS NFR BLD AUTO: 89 %
NRBC BLD AUTO-RTO: 0 %
PHOSPHATE SERPL-MCNC: 2.5 MG/DL (ref 2.3–4.7)
PLATELET # BLD AUTO: 231 X10(3)/MCL (ref 130–400)
PMV BLD AUTO: 11.6 FL (ref 7.4–10.4)
POTASSIUM SERPL-SCNC: 4.1 MMOL/L (ref 3.5–5.1)
PROT SERPL-MCNC: 6.9 GM/DL (ref 5.8–7.6)
RBC # BLD AUTO: 3.46 X10(6)/MCL (ref 4.2–5.4)
SODIUM SERPL-SCNC: 139 MMOL/L (ref 136–145)
WBC # SPEC AUTO: 17.16 X10(3)/MCL (ref 4.5–11.5)

## 2024-06-09 PROCEDURE — 85025 COMPLETE CBC W/AUTO DIFF WBC: CPT

## 2024-06-09 PROCEDURE — 63600175 PHARM REV CODE 636 W HCPCS

## 2024-06-09 PROCEDURE — 83735 ASSAY OF MAGNESIUM: CPT

## 2024-06-09 PROCEDURE — 84100 ASSAY OF PHOSPHORUS: CPT

## 2024-06-09 PROCEDURE — 80053 COMPREHEN METABOLIC PANEL: CPT

## 2024-06-09 PROCEDURE — 25000003 PHARM REV CODE 250

## 2024-06-09 PROCEDURE — 11000001 HC ACUTE MED/SURG PRIVATE ROOM

## 2024-06-09 RX ORDER — OXYCODONE HYDROCHLORIDE 5 MG/1
5 TABLET ORAL EVERY 4 HOURS PRN
Status: DISCONTINUED | OUTPATIENT
Start: 2024-06-09 | End: 2024-06-13 | Stop reason: HOSPADM

## 2024-06-09 RX ORDER — ONDANSETRON HYDROCHLORIDE 2 MG/ML
4 INJECTION, SOLUTION INTRAVENOUS EVERY 4 HOURS PRN
Status: DISCONTINUED | OUTPATIENT
Start: 2024-06-09 | End: 2024-06-13 | Stop reason: HOSPADM

## 2024-06-09 RX ORDER — ACETAMINOPHEN 325 MG/1
650 TABLET ORAL EVERY 6 HOURS
Status: DISCONTINUED | OUTPATIENT
Start: 2024-06-09 | End: 2024-06-13 | Stop reason: HOSPADM

## 2024-06-09 RX ORDER — OXYCODONE HYDROCHLORIDE 10 MG/1
10 TABLET ORAL EVERY 4 HOURS PRN
Status: DISCONTINUED | OUTPATIENT
Start: 2024-06-09 | End: 2024-06-13 | Stop reason: HOSPADM

## 2024-06-09 RX ORDER — DOCUSATE SODIUM 100 MG/1
100 CAPSULE, LIQUID FILLED ORAL 2 TIMES DAILY
Status: DISCONTINUED | OUTPATIENT
Start: 2024-06-09 | End: 2024-06-13 | Stop reason: HOSPADM

## 2024-06-09 RX ORDER — MORPHINE SULFATE 4 MG/ML
2 INJECTION, SOLUTION INTRAMUSCULAR; INTRAVENOUS EVERY 6 HOURS PRN
Status: DISCONTINUED | OUTPATIENT
Start: 2024-06-09 | End: 2024-06-13 | Stop reason: HOSPADM

## 2024-06-09 RX ORDER — HYDRALAZINE HYDROCHLORIDE 20 MG/ML
10 INJECTION INTRAMUSCULAR; INTRAVENOUS EVERY 6 HOURS PRN
Status: DISCONTINUED | OUTPATIENT
Start: 2024-06-09 | End: 2024-06-13 | Stop reason: HOSPADM

## 2024-06-09 RX ORDER — TALC
6 POWDER (GRAM) TOPICAL NIGHTLY PRN
Status: DISCONTINUED | OUTPATIENT
Start: 2024-06-09 | End: 2024-06-13 | Stop reason: HOSPADM

## 2024-06-09 RX ORDER — LISINOPRIL 10 MG/1
10 TABLET ORAL DAILY
Status: DISCONTINUED | OUTPATIENT
Start: 2024-06-09 | End: 2024-06-13 | Stop reason: HOSPADM

## 2024-06-09 RX ORDER — SODIUM CHLORIDE 9 MG/ML
INJECTION, SOLUTION INTRAVENOUS CONTINUOUS
Status: DISCONTINUED | OUTPATIENT
Start: 2024-06-09 | End: 2024-06-13 | Stop reason: HOSPADM

## 2024-06-09 RX ORDER — ENOXAPARIN SODIUM 100 MG/ML
40 INJECTION SUBCUTANEOUS EVERY 24 HOURS
Status: DISCONTINUED | OUTPATIENT
Start: 2024-06-09 | End: 2024-06-13 | Stop reason: HOSPADM

## 2024-06-09 RX ORDER — POLYETHYLENE GLYCOL 3350 17 G/17G
17 POWDER, FOR SOLUTION ORAL DAILY
Status: DISCONTINUED | OUTPATIENT
Start: 2024-06-09 | End: 2024-06-13 | Stop reason: HOSPADM

## 2024-06-09 RX ADMIN — LISINOPRIL 10 MG: 10 TABLET ORAL at 09:06

## 2024-06-09 RX ADMIN — POLYETHYLENE GLYCOL 3350 17 G: 17 POWDER, FOR SOLUTION ORAL at 09:06

## 2024-06-09 RX ADMIN — PIPERACILLIN SODIUM AND TAZOBACTAM SODIUM 4.5 G: 4; .5 INJECTION, POWDER, LYOPHILIZED, FOR SOLUTION INTRAVENOUS at 09:06

## 2024-06-09 RX ADMIN — DOCUSATE SODIUM 100 MG: 100 CAPSULE, LIQUID FILLED ORAL at 09:06

## 2024-06-09 RX ADMIN — PIPERACILLIN SODIUM AND TAZOBACTAM SODIUM 4.5 G: 4; .5 INJECTION, POWDER, LYOPHILIZED, FOR SOLUTION INTRAVENOUS at 11:06

## 2024-06-09 RX ADMIN — PIPERACILLIN SODIUM AND TAZOBACTAM SODIUM 4.5 G: 4; .5 INJECTION, POWDER, LYOPHILIZED, FOR SOLUTION INTRAVENOUS at 02:06

## 2024-06-09 RX ADMIN — ENOXAPARIN SODIUM 40 MG: 40 INJECTION SUBCUTANEOUS at 05:06

## 2024-06-09 RX ADMIN — SODIUM CHLORIDE: 9 INJECTION, SOLUTION INTRAVENOUS at 02:06

## 2024-06-09 NOTE — ED PROVIDER NOTES
Encounter Date: 6/8/2024       History     Chief Complaint   Patient presents with    Transfer     Arrives [facility] post rio prob     Patient is a 63-year-old female who was transferred from an outlying facility secondary to abnormal CT scan of the abdomen.  Patient is status post laparoscopic cholecystectomy on 06/01/2024.  Patient states her postop period was going well until taking a shower earlier this evening, afterwards she started having acute onset of right upper quadrant pain.  Patient presented to the transferring hospital where CT scan was done.  IV contrast was used.  Per Radiology, results of CT RS follows:  in the gallbladder fossa there is a loculated 6 x 5 x 3.3 cm fluid collection.  Differential includes biloma or hematoma.  There is also an additional lenticular form fluid collection overlying the right hepatic lobe.  HIDA scan was recommended.   Patient was given Zosyn 3.375 g at the transferring facility.    White blood cell count at the transferring hospital was 17,300. H&H was 11.7 and 35.  LFTs were as follows:  AST 97,  and alk-phos 192.        Review of patient's allergies indicates:   Allergen Reactions    Bactrim [sulfamethoxazole-trimethoprim]     Milk containing products (dairy)      No past medical history on file.  Past Surgical History:   Procedure Laterality Date    LAPAROSCOPIC CHOLECYSTECTOMY N/A 6/1/2024    Procedure: CHOLECYSTECTOMY, LAPAROSCOPIC;  Surgeon: Junito Hamm MD;  Location: [facility];  Service: General;  Laterality: N/A;     No family history on file.     Review of Systems    Physical Exam     Initial Vitals [06/08/24 2305]   BP Pulse Resp Temp SpO2   (!) 160/65 95 16 98.6 °F (37 °C) 100 %      MAP       --         Physical Exam    ED Course   Procedures  Labs Reviewed - No data to display       Imaging Results    None          Medications - No data to display  Medical Decision Making  Differential diagnosis:  Acute right upper quadrant pain,  status post laparoscopic cholecystectomy, intra-abdominal fluid right upper quadrant, hematoma, biloma, abscess    Amount and/or Complexity of Data Reviewed  Discussion of management or test interpretation with external provider(s): General surgery was consulted, they will evaluate patient and admit               ED Course as of 06/09/24 0049   Sun Jun 09, 2024   0023 General surgery was consulted, states will evaluate [KG]      ED Course User Index  [KG] Ej Rosa MD                           Clinical Impression:  Final diagnoses:  [R10.11] Right upper quadrant pain (Primary)  [R18.8] Intra-abdominal fluid collection          ED Disposition Condition    Admit Stable                Ej Rosa MD  06/09/24 0049

## 2024-06-09 NOTE — H&P
[facility] General Surgery Service  CONSULT / H&P NOTE  Date: 6/9/2024    CC:   Yana Rand is a 63 y.o. female presenting with abdominal pain    HPI:   63 year old female with history of lap rio on 6/1 with Dr. Hamm presents as transfer from OSH for abdominal pain. Patient reports she was having a normal postoperative course until today when she started experiencing abdominal pain. She denies associated symptoms such as fever, nausea, vomiting, decreased PO intake, diarrhea or constipation. Last BM was today. OSH got a CT AP which showed fluid in the gallbladder fossa and around the liver. WBC elevated at 17.     PMH:   No past medical history on file.    PSH:   Past Surgical History:   Procedure Laterality Date    LAPAROSCOPIC CHOLECYSTECTOMY N/A 6/1/2024    Procedure: CHOLECYSTECTOMY, LAPAROSCOPIC;  Surgeon: Junito Hamm MD;  Location: [facility] OR;  Service: General;  Laterality: N/A;       FamHx:   No family history on file.    SocHx:  Social History     Socioeconomic History    Marital status:        Allergies:   Review of patient's allergies indicates:   Allergen Reactions    Bactrim [sulfamethoxazole-trimethoprim]     Milk containing products (dairy)        Medications:  No current facility-administered medications on file prior to encounter.     Current Outpatient Medications on File Prior to Encounter   Medication Sig Dispense Refill    ALPRAZolam (XANAX) 0.25 MG tablet Take 0.25 mg by mouth 2 (two) times a day.      docusate sodium (COLACE) 100 MG capsule Take 1 capsule (100 mg total) by mouth 2 (two) times daily.      estrogens, conjugated, (PREMARIN) 0.625 MG tablet Take 0.625 mg by mouth once daily.      lisinopriL 10 MG tablet Take 1 tablet (10 mg total) by mouth once daily. 90 tablet 3    oxyCODONE-acetaminophen (PERCOCET)  mg per tablet Take 1 tablet by mouth every 4 (four) hours as needed for Pain. 15 tablet 0    polyethylene glycol (GLYCOLAX) 17 gram/dose powder Take 17 g by mouth as  "needed for Constipation.           ROS:   Negative     Objective:    VITAL SIGNS: 24 HR MIN & MAX LAST    Temp  Min: 98.6 °F (37 °C)  Max: 98.6 °F (37 °C)  98.6 °F (37 °C)        BP  Min: 144/71  Max: 160/65  (!) 146/71     Pulse  Min: 80  Max: 95  80     Resp  Min: 16  Max: 22  18    SpO2  Min: 95 %  Max: 100 %  95 %      HT: 5' 7" (170.2 cm)  WT: 49.9 kg (110 lb)  BMI: 17.2       Physical Exam:  Physical Exam  Constitutional:       General: She is not in acute distress.     Appearance: Normal appearance.   HENT:      Head: Normocephalic and atraumatic.   Cardiovascular:      Rate and Rhythm: Normal rate.      Pulses: Normal pulses.   Pulmonary:      Effort: Pulmonary effort is normal. No respiratory distress.   Abdominal:      General: Abdomen is flat.      Palpations: Abdomen is soft.      Comments: Mild TTP RUQ and epigastric region. Non-peritonitic    Musculoskeletal:         General: Normal range of motion.   Skin:     General: Skin is warm and dry.   Neurological:      General: No focal deficit present.      Mental Status: She is alert and oriented to person, place, and time.   Psychiatric:         Mood and Affect: Mood normal.         Behavior: Behavior normal.           Results  Recent Labs   Lab 06/02/24  0713   WBC 17.37*   HGB 11.7*   HCT 35.4*         CO2 22*   BUN 17.6   CREATININE 0.69   GLUCOSE 118*   CALCIUM 9.1   ALKPHOS 192*       Imaging:  CT AP OSH: fluid in gallbladder fossa and lateral to the liver. Some pockets of air in the gallbladder fossa    A/P:   63 y.o. female with history of lap rio on 6/1 with Dr. Hamm presents as transfer from OSH for abdominal pain. Found to have fluid in the gallbladder lori and along the lateral edge of the lvier with WBC elevated at 17.       -admit to surgery  -HIDA for evaluation of possible biloma, IR consult in the AM  -NPO, mIVFs  -PRN pain/nausea control   -Karli Reis MD   [employer] General Surgery PGY2      "

## 2024-06-10 LAB
ALBUMIN SERPL-MCNC: 2.8 G/DL (ref 3.4–4.8)
ALBUMIN/GLOB SERPL: 1 RATIO (ref 1.1–2)
ALP SERPL-CCNC: 118 UNIT/L (ref 40–150)
ALT SERPL-CCNC: 49 UNIT/L (ref 0–55)
ANION GAP SERPL CALC-SCNC: 7 MEQ/L
AST SERPL-CCNC: 24 UNIT/L (ref 5–34)
BASOPHILS # BLD AUTO: 0.02 X10(3)/MCL
BASOPHILS NFR BLD AUTO: 0.2 %
BILIRUB SERPL-MCNC: 0.6 MG/DL
BUN SERPL-MCNC: 9 MG/DL (ref 9.8–20.1)
CALCIUM SERPL-MCNC: 8.5 MG/DL (ref 8.4–10.2)
CHLORIDE SERPL-SCNC: 105 MMOL/L (ref 98–107)
CO2 SERPL-SCNC: 25 MMOL/L (ref 23–31)
CREAT SERPL-MCNC: 0.7 MG/DL (ref 0.55–1.02)
CREAT/UREA NIT SERPL: 13
EOSINOPHIL # BLD AUTO: 0.04 X10(3)/MCL (ref 0–0.9)
EOSINOPHIL NFR BLD AUTO: 0.3 %
ERYTHROCYTE [DISTWIDTH] IN BLOOD BY AUTOMATED COUNT: 11.9 % (ref 11.5–17)
GFR SERPLBLD CREATININE-BSD FMLA CKD-EPI: >60 ML/MIN/1.73/M2
GLOBULIN SER-MCNC: 2.7 GM/DL (ref 2.4–3.5)
GLUCOSE SERPL-MCNC: 91 MG/DL (ref 82–115)
HCT VFR BLD AUTO: 31.5 % (ref 37–47)
HGB BLD-MCNC: 10.2 G/DL (ref 12–16)
IMM GRANULOCYTES # BLD AUTO: 0.09 X10(3)/MCL (ref 0–0.04)
IMM GRANULOCYTES NFR BLD AUTO: 0.8 %
LYMPHOCYTES # BLD AUTO: 0.95 X10(3)/MCL (ref 0.6–4.6)
LYMPHOCYTES NFR BLD AUTO: 8.1 %
MAGNESIUM SERPL-MCNC: 2 MG/DL (ref 1.6–2.6)
MCH RBC QN AUTO: 32.7 PG (ref 27–31)
MCHC RBC AUTO-ENTMCNC: 32.4 G/DL (ref 33–36)
MCV RBC AUTO: 101 FL (ref 80–94)
MONOCYTES # BLD AUTO: 1.51 X10(3)/MCL (ref 0.1–1.3)
MONOCYTES NFR BLD AUTO: 12.9 %
NEUTROPHILS # BLD AUTO: 9.11 X10(3)/MCL (ref 2.1–9.2)
NEUTROPHILS NFR BLD AUTO: 77.7 %
NRBC BLD AUTO-RTO: 0 %
PHOSPHATE SERPL-MCNC: 2.8 MG/DL (ref 2.3–4.7)
PLATELET # BLD AUTO: 220 X10(3)/MCL (ref 130–400)
PMV BLD AUTO: 11 FL (ref 7.4–10.4)
POTASSIUM SERPL-SCNC: 3.9 MMOL/L (ref 3.5–5.1)
PROT SERPL-MCNC: 5.5 GM/DL (ref 5.8–7.6)
RBC # BLD AUTO: 3.12 X10(6)/MCL (ref 4.2–5.4)
SODIUM SERPL-SCNC: 137 MMOL/L (ref 136–145)
WBC # SPEC AUTO: 11.72 X10(3)/MCL (ref 4.5–11.5)

## 2024-06-10 PROCEDURE — 83735 ASSAY OF MAGNESIUM: CPT

## 2024-06-10 PROCEDURE — 80053 COMPREHEN METABOLIC PANEL: CPT

## 2024-06-10 PROCEDURE — 84100 ASSAY OF PHOSPHORUS: CPT

## 2024-06-10 PROCEDURE — A9537 TC99M MEBROFENIN: HCPCS | Performed by: STUDENT IN AN ORGANIZED HEALTH CARE EDUCATION/TRAINING PROGRAM

## 2024-06-10 PROCEDURE — 63600175 PHARM REV CODE 636 W HCPCS

## 2024-06-10 PROCEDURE — 36415 COLL VENOUS BLD VENIPUNCTURE: CPT

## 2024-06-10 PROCEDURE — 25000003 PHARM REV CODE 250

## 2024-06-10 PROCEDURE — 11000001 HC ACUTE MED/SURG PRIVATE ROOM

## 2024-06-10 PROCEDURE — 85025 COMPLETE CBC W/AUTO DIFF WBC: CPT

## 2024-06-10 RX ORDER — KIT FOR THE PREPARATION OF TECHNETIUM TC 99M MEBROFENIN 45 MG/10ML
8.8 INJECTION, POWDER, LYOPHILIZED, FOR SOLUTION INTRAVENOUS
Status: COMPLETED | OUTPATIENT
Start: 2024-06-10 | End: 2024-06-10

## 2024-06-10 RX ADMIN — SODIUM CHLORIDE: 9 INJECTION, SOLUTION INTRAVENOUS at 03:06

## 2024-06-10 RX ADMIN — PIPERACILLIN SODIUM AND TAZOBACTAM SODIUM 4.5 G: 4; .5 INJECTION, POWDER, LYOPHILIZED, FOR SOLUTION INTRAVENOUS at 03:06

## 2024-06-10 RX ADMIN — PIPERACILLIN SODIUM AND TAZOBACTAM SODIUM 4.5 G: 4; .5 INJECTION, POWDER, LYOPHILIZED, FOR SOLUTION INTRAVENOUS at 02:06

## 2024-06-10 RX ADMIN — PIPERACILLIN SODIUM AND TAZOBACTAM SODIUM 4.5 G: 4; .5 INJECTION, POWDER, LYOPHILIZED, FOR SOLUTION INTRAVENOUS at 10:06

## 2024-06-10 RX ADMIN — MEBROFENIN 8.8 MILLICURIE: 45 INJECTION, POWDER, LYOPHILIZED, FOR SOLUTION INTRAVENOUS at 11:06

## 2024-06-10 RX ADMIN — DOCUSATE SODIUM 100 MG: 100 CAPSULE, LIQUID FILLED ORAL at 08:06

## 2024-06-10 NOTE — PROGRESS NOTES
"   Acute Care Surgery   Progress Note  Admit Date: 6/8/2024  HD#1  POD#* No surgery found *    Subjective:   Interval history:  AF HDS   Pain well controlled   Denies any nausea or vomiting   No BM or flatus   Ambulating without issue     Home Meds:  Current Outpatient Medications   Medication Instructions    ALPRAZolam (XANAX) 0.25 mg, Oral, 2 times daily    docusate sodium (COLACE) 100 mg, Oral, 2 times daily    estrogens (conjugated) (PREMARIN) 0.625 mg, Oral, Daily    lisinopriL 10 mg, Oral, Daily    polyethylene glycol (GLYCOLAX) 17 g, Oral, As needed (PRN)      Scheduled Meds:   acetaminophen  650 mg Oral Q6H    docusate sodium  100 mg Oral BID    enoxparin  40 mg Subcutaneous Daily    lisinopriL  10 mg Oral Daily    piperacillin-tazobactam (Zosyn) IV (PEDS and ADULTS) (extended infusion is not appropriate)  4.5 g Intravenous Q8H    polyethylene glycol  17 g Oral Daily     Continuous Infusions:   sodium chloride 0.9%   Intravenous Continuous 75 mL/hr at 06/10/24 0343 New Bag at 06/10/24 0343     PRN Meds:  Current Facility-Administered Medications:     hydrALAZINE, 10 mg, Intravenous, Q6H PRN    melatonin, 6 mg, Oral, Nightly PRN    morphine, 2 mg, Intravenous, Q6H PRN    ondansetron, 4 mg, Intravenous, Q4H PRN    oxyCODONE, 5 mg, Oral, Q4H PRN    oxyCODONE, 10 mg, Oral, Q4H PRN     Objective:     VITAL SIGNS: 24 HR MIN & MAX LAST   Temp  Min: 98.3 °F (36.8 °C)  Max: 98.9 °F (37.2 °C)  98.3 °F (36.8 °C)   BP  Min: 114/68  Max: 149/63  122/65    Pulse  Min: 85  Max: 100  93    Resp  Min: 16  Max: 19  18    SpO2  Min: 96 %  Max: 100 %  96 %      HT: 5' 7" (170.2 cm)  WT: 49.9 kg (110 lb)  BMI: 17.2     Intake/output:  Intake/Output - Last 3 Shifts         06/08 0700 06/09 0659 06/09 0700  06/10 0659 06/10 0700  06/11 0659    P.O.  0     Total Intake(mL/kg)  0 (0)     Urine (mL/kg/hr)  1900 (1.6)     Stool  0     Total Output  1900     Net  -1900            Stool Occurrence  0 x             Intake/Output Summary " "(Last 24 hours) at 6/10/2024 0755  Last data filed at 6/10/2024 0351  Gross per 24 hour   Intake 0 ml   Output 1900 ml   Net -1900 ml           Lines/drains/airway:       Peripheral IV - Single Lumen 06/08/24 22 G Left Antecubital (Active)   Site Assessment Clean;Dry;Intact;No redness;No swelling 06/09/24 2100   Extremity Assessment Distal to IV No abnormal discoloration;No redness;No swelling;No warmth 06/09/24 2100   Line Status Infusing 06/09/24 2100   Number of days: 2       Physical examination:  Gen: NAD, AAOx3, answering questions appropriately  HEENT: atraumatic, NG in place   CV: RR  Resp: NWOB  Abd: S/ND, moderately tender to RUQ and epigastric region, incisions clean, dry, intact   Ext: moving all extremities spontaneously and purposefully  Neuro: CN II-XII grossly intact  Skin/wounds: WWP, no wounds     Labs:  Renal:  Recent Labs     06/09/24 0126 06/10/24  0451   BUN 11.0 9.0*   CREATININE 0.75 0.70     No results for input(s): "LACTIC" in the last 72 hours.  FENGI:  Recent Labs     06/09/24  0126 06/10/24  0451    137   K 4.1 3.9    105   CO2 24 25   CALCIUM 9.7 8.5   MG 2.10 2.00   PHOS 2.5 2.8   ALBUMIN 3.4 2.8*   BILITOT 0.4 0.6   AST 32 24   ALKPHOS 125 118   ALT 72* 49     Heme:  Recent Labs     06/09/24  0126 06/10/24  0451   HGB 11.2* 10.2*   HCT 35.1* 31.5*    220     ID:  Recent Labs     06/09/24  0126 06/10/24  0451   WBC 17.16* 11.72*     CBG:  Recent Labs     06/09/24  0126 06/10/24  0451   GLUCOSE 135* 91      Cardiovascular:  No results for input(s): "TROPONINI", "CKTOTAL", "CKMB", "BNP" in the last 168 hours.  I have reviewed all pertinent lab results within the past 24 hours.    Imaging:  CT Previous   Final Result      NM Hepatobiliary Scan (HIDA)    (Results Pending)   CT Guided Abscess Drainage With Catheter    (Results Pending)      I have reviewed all pertinent imaging results/findings within the past 24 hours.    Micro/Path/Other:  Microbiology Results (last 7 " days)       ** No results found for the last 168 hours. **           Pathology Results  (Last 7 days)      None             Assessment & Plan:   63 y.o. female with history of lap rio on 6/1 with Dr. Hamm presents as transfer from OSH for abdominal pain. Found to have fluid in the gallbladder lori and along the lateral edge of the lvier with WBC elevated at 17.     -HIDA for evaluation of possible biloma  -IR consult for drainage of fluid collection   -NPO, mIVFs  -PRN pain/nausea control   -Karli Flores MD  [employer] General Surgery PGY-1

## 2024-06-10 NOTE — CLINICAL REVIEW
63-year-old female admitted on 06/08/2024 with abdominal pain.  Previous history of laparoscopic cholecystectomy on 06/01/2024.  Imaging demonstrated gallbladder fossa fluid as well as perihepatic fluid.  Found to have leukocytosis.  Surgery consulted.  There was a consideration for possible biloma.  Remained NPO, multimodal pain control, anti-infective therapy.  At the present time, given the concern for potential complications following a lap choly, persistent leukocytosis, pending HIDA scan results to rule out myeloma/bile leak, continuation of multimodal pain control, anti-infective therapy, would favor continuation of IP LOC       Dominic Hubbard MD  Utilization Management  Physician Advisor  [facility]  06/10/2024

## 2024-06-10 NOTE — PHYSICIAN QUERY
Please clarify the pathology findings:  Pathology findings noted above are ruled in/confirmed as diagnoses

## 2024-06-11 LAB
ALBUMIN SERPL-MCNC: 3.1 G/DL (ref 3.4–4.8)
ALBUMIN/GLOB SERPL: 1 RATIO (ref 1.1–2)
ALP SERPL-CCNC: 129 UNIT/L (ref 40–150)
ALT SERPL-CCNC: 46 UNIT/L (ref 0–55)
ANION GAP SERPL CALC-SCNC: 11 MEQ/L
AST SERPL-CCNC: 22 UNIT/L (ref 5–34)
BASOPHILS # BLD AUTO: 0.03 X10(3)/MCL
BASOPHILS NFR BLD AUTO: 0.3 %
BILIRUB SERPL-MCNC: 0.7 MG/DL
BUN SERPL-MCNC: 9.5 MG/DL (ref 9.8–20.1)
CALCIUM SERPL-MCNC: 9.1 MG/DL (ref 8.4–10.2)
CHLORIDE SERPL-SCNC: 105 MMOL/L (ref 98–107)
CO2 SERPL-SCNC: 22 MMOL/L (ref 23–31)
CREAT SERPL-MCNC: 0.69 MG/DL (ref 0.55–1.02)
CREAT/UREA NIT SERPL: 14
EOSINOPHIL # BLD AUTO: 0.07 X10(3)/MCL (ref 0–0.9)
EOSINOPHIL NFR BLD AUTO: 0.7 %
ERYTHROCYTE [DISTWIDTH] IN BLOOD BY AUTOMATED COUNT: 11.9 % (ref 11.5–17)
GFR SERPLBLD CREATININE-BSD FMLA CKD-EPI: >60 ML/MIN/1.73/M2
GLOBULIN SER-MCNC: 3.2 GM/DL (ref 2.4–3.5)
GLUCOSE SERPL-MCNC: 93 MG/DL (ref 82–115)
GRAM STN SPEC: NORMAL
HCT VFR BLD AUTO: 32.9 % (ref 37–47)
HGB BLD-MCNC: 10.7 G/DL (ref 12–16)
IMM GRANULOCYTES # BLD AUTO: 0.08 X10(3)/MCL (ref 0–0.04)
IMM GRANULOCYTES NFR BLD AUTO: 0.8 %
LYMPHOCYTES # BLD AUTO: 1.32 X10(3)/MCL (ref 0.6–4.6)
LYMPHOCYTES NFR BLD AUTO: 13.1 %
MAGNESIUM SERPL-MCNC: 1.9 MG/DL (ref 1.6–2.6)
MCH RBC QN AUTO: 32.5 PG (ref 27–31)
MCHC RBC AUTO-ENTMCNC: 32.5 G/DL (ref 33–36)
MCV RBC AUTO: 100 FL (ref 80–94)
MONOCYTES # BLD AUTO: 1.21 X10(3)/MCL (ref 0.1–1.3)
MONOCYTES NFR BLD AUTO: 12 %
NEUTROPHILS # BLD AUTO: 7.39 X10(3)/MCL (ref 2.1–9.2)
NEUTROPHILS NFR BLD AUTO: 73.1 %
NRBC BLD AUTO-RTO: 0 %
PHOSPHATE SERPL-MCNC: 2.5 MG/DL (ref 2.3–4.7)
PLATELET # BLD AUTO: 280 X10(3)/MCL (ref 130–400)
PMV BLD AUTO: 10.7 FL (ref 7.4–10.4)
POTASSIUM SERPL-SCNC: 3.5 MMOL/L (ref 3.5–5.1)
PROT SERPL-MCNC: 6.3 GM/DL (ref 5.8–7.6)
RBC # BLD AUTO: 3.29 X10(6)/MCL (ref 4.2–5.4)
SODIUM SERPL-SCNC: 138 MMOL/L (ref 136–145)
WBC # SPEC AUTO: 10.1 X10(3)/MCL (ref 4.5–11.5)

## 2024-06-11 PROCEDURE — 83735 ASSAY OF MAGNESIUM: CPT

## 2024-06-11 PROCEDURE — 36415 COLL VENOUS BLD VENIPUNCTURE: CPT

## 2024-06-11 PROCEDURE — 0W9G30Z DRAINAGE OF PERITONEAL CAVITY WITH DRAINAGE DEVICE, PERCUTANEOUS APPROACH: ICD-10-PCS | Performed by: RADIOLOGY

## 2024-06-11 PROCEDURE — 25000003 PHARM REV CODE 250

## 2024-06-11 PROCEDURE — 87075 CULTR BACTERIA EXCEPT BLOOD: CPT | Performed by: STUDENT IN AN ORGANIZED HEALTH CARE EDUCATION/TRAINING PROGRAM

## 2024-06-11 PROCEDURE — 94760 N-INVAS EAR/PLS OXIMETRY 1: CPT

## 2024-06-11 PROCEDURE — 27000221 HC OXYGEN, UP TO 24 HOURS

## 2024-06-11 PROCEDURE — 63600175 PHARM REV CODE 636 W HCPCS: Performed by: RADIOLOGY

## 2024-06-11 PROCEDURE — 87070 CULTURE OTHR SPECIMN AEROBIC: CPT | Performed by: STUDENT IN AN ORGANIZED HEALTH CARE EDUCATION/TRAINING PROGRAM

## 2024-06-11 PROCEDURE — 84100 ASSAY OF PHOSPHORUS: CPT

## 2024-06-11 PROCEDURE — 85025 COMPLETE CBC W/AUTO DIFF WBC: CPT

## 2024-06-11 PROCEDURE — 80053 COMPREHEN METABOLIC PANEL: CPT

## 2024-06-11 PROCEDURE — 87205 SMEAR GRAM STAIN: CPT | Performed by: STUDENT IN AN ORGANIZED HEALTH CARE EDUCATION/TRAINING PROGRAM

## 2024-06-11 PROCEDURE — 11000001 HC ACUTE MED/SURG PRIVATE ROOM

## 2024-06-11 PROCEDURE — 63600175 PHARM REV CODE 636 W HCPCS

## 2024-06-11 PROCEDURE — 25000003 PHARM REV CODE 250: Performed by: RADIOLOGY

## 2024-06-11 RX ORDER — FENTANYL CITRATE 50 UG/ML
INJECTION, SOLUTION INTRAMUSCULAR; INTRAVENOUS
Status: COMPLETED | OUTPATIENT
Start: 2024-06-11 | End: 2024-06-11

## 2024-06-11 RX ORDER — FENTANYL CITRATE 50 UG/ML
INJECTION, SOLUTION INTRAMUSCULAR; INTRAVENOUS
Status: DISPENSED
Start: 2024-06-11 | End: 2024-06-12

## 2024-06-11 RX ORDER — MIDAZOLAM HYDROCHLORIDE 2 MG/2ML
INJECTION, SOLUTION INTRAMUSCULAR; INTRAVENOUS
Status: DISPENSED
Start: 2024-06-11 | End: 2024-06-12

## 2024-06-11 RX ORDER — LIDOCAINE HYDROCHLORIDE 20 MG/ML
INJECTION, SOLUTION INFILTRATION; PERINEURAL
Status: COMPLETED | OUTPATIENT
Start: 2024-06-11 | End: 2024-06-11

## 2024-06-11 RX ORDER — MIDAZOLAM HYDROCHLORIDE 1 MG/ML
INJECTION, SOLUTION INTRAMUSCULAR; INTRAVENOUS
Status: COMPLETED | OUTPATIENT
Start: 2024-06-11 | End: 2024-06-11

## 2024-06-11 RX ADMIN — OXYCODONE HYDROCHLORIDE 10 MG: 10 TABLET ORAL at 01:06

## 2024-06-11 RX ADMIN — MIDAZOLAM 1 MG: 1 INJECTION INTRAMUSCULAR; INTRAVENOUS at 12:06

## 2024-06-11 RX ADMIN — PIPERACILLIN SODIUM AND TAZOBACTAM SODIUM 4.5 G: 4; .5 INJECTION, POWDER, LYOPHILIZED, FOR SOLUTION INTRAVENOUS at 11:06

## 2024-06-11 RX ADMIN — ENOXAPARIN SODIUM 40 MG: 40 INJECTION SUBCUTANEOUS at 04:06

## 2024-06-11 RX ADMIN — FENTANYL CITRATE 25 MCG: 50 INJECTION, SOLUTION INTRAMUSCULAR; INTRAVENOUS at 12:06

## 2024-06-11 RX ADMIN — PIPERACILLIN SODIUM AND TAZOBACTAM SODIUM 4.5 G: 4; .5 INJECTION, POWDER, LYOPHILIZED, FOR SOLUTION INTRAVENOUS at 01:06

## 2024-06-11 RX ADMIN — PIPERACILLIN SODIUM AND TAZOBACTAM SODIUM 4.5 G: 4; .5 INJECTION, POWDER, LYOPHILIZED, FOR SOLUTION INTRAVENOUS at 06:06

## 2024-06-11 RX ADMIN — LIDOCAINE HYDROCHLORIDE 5 ML: 20 INJECTION, SOLUTION INFILTRATION; PERINEURAL at 12:06

## 2024-06-11 NOTE — PROGRESS NOTES
"   Acute Care Surgery   Progress Note  Admit Date: 6/8/2024  HD#2  POD#* No surgery found *    Subjective:   Interval history:  AF HDS   Pain well controlled   Denies any nausea or vomiting   No BM or flatus   Ambulating without issue     Home Meds:  Current Outpatient Medications   Medication Instructions    ALPRAZolam (XANAX) 0.25 mg, Oral, 2 times daily    docusate sodium (COLACE) 100 mg, Oral, 2 times daily    estrogens (conjugated) (PREMARIN) 0.625 mg, Oral, Daily    lisinopriL 10 mg, Oral, Daily    polyethylene glycol (GLYCOLAX) 17 g, Oral, As needed (PRN)      Scheduled Meds:   acetaminophen  650 mg Oral Q6H    docusate sodium  100 mg Oral BID    enoxparin  40 mg Subcutaneous Daily    lisinopriL  10 mg Oral Daily    piperacillin-tazobactam (Zosyn) IV (PEDS and ADULTS) (extended infusion is not appropriate)  4.5 g Intravenous Q8H    polyethylene glycol  17 g Oral Daily     Continuous Infusions:   sodium chloride 0.9%   Intravenous Continuous   Stopped at 06/10/24 1036     PRN Meds:  Current Facility-Administered Medications:     hydrALAZINE, 10 mg, Intravenous, Q6H PRN    melatonin, 6 mg, Oral, Nightly PRN    morphine, 2 mg, Intravenous, Q6H PRN    ondansetron, 4 mg, Intravenous, Q4H PRN    oxyCODONE, 5 mg, Oral, Q4H PRN    oxyCODONE, 10 mg, Oral, Q4H PRN     Objective:     VITAL SIGNS: 24 HR MIN & MAX LAST   Temp  Min: 98.2 °F (36.8 °C)  Max: 98.9 °F (37.2 °C)  98.9 °F (37.2 °C)   BP  Min: 113/65  Max: 146/67  113/65    Pulse  Min: 84  Max: 101  101    Resp  Min: 16  Max: 19  16    SpO2  Min: 96 %  Max: 100 %  97 %      HT: 5' 7" (170.2 cm)  WT: 49.9 kg (110 lb)  BMI: 17.2     Intake/output:  Intake/Output - Last 3 Shifts         06/09 0700  06/10 0659 06/10 0700 06/11 0659 06/11 0700 06/12 0659    P.O. 0 240     I.V. (mL/kg)  2355.3 (47.2)     IV Piggyback  479.8     Total Intake(mL/kg) 0 (0) 3075.1 (61.6)     Urine (mL/kg/hr) 1900 (1.6) 1200 (1)     Stool 0 0     Total Output 1900 1200     Net -1900 " "+1875.1            Stool Occurrence 0 x 0 x             Intake/Output Summary (Last 24 hours) at 6/11/2024 0726  Last data filed at 6/10/2024 2240  Gross per 24 hour   Intake 3075.06 ml   Output 1200 ml   Net 1875.06 ml           Lines/drains/airway:       Peripheral IV - Single Lumen 06/08/24 22 G Left Antecubital (Active)   Site Assessment Clean;Dry;Intact;No redness;No swelling 06/09/24 2100   Extremity Assessment Distal to IV No abnormal discoloration;No redness;No swelling;No warmth 06/09/24 2100   Line Status Infusing 06/09/24 2100   Number of days: 2       Physical examination:  Gen: NAD, AAOx3, answering questions appropriately  HEENT: atraumatic, NG in place   CV: RR  Resp: NWOB  Abd: S/ND, moderately tender to RUQ and epigastric region, incisions clean, dry, intact   Ext: moving all extremities spontaneously and purposefully  Neuro: CN II-XII grossly intact  Skin/wounds: WWP, no wounds     Labs:  Renal:  Recent Labs     06/09/24  0126 06/10/24  0451 06/11/24  0529   BUN 11.0 9.0* 9.5*   CREATININE 0.75 0.70 0.69     No results for input(s): "LACTIC" in the last 72 hours.  FENGI:  Recent Labs     06/09/24  0126 06/10/24  0451 06/11/24  0529    137 138   K 4.1 3.9 3.5    105 105   CO2 24 25 22*   CALCIUM 9.7 8.5 9.1   MG 2.10 2.00 1.90   PHOS 2.5 2.8 2.5   ALBUMIN 3.4 2.8* 3.1*   BILITOT 0.4 0.6 0.7   AST 32 24 22   ALKPHOS 125 118 129   ALT 72* 49 46     Heme:  Recent Labs     06/09/24  0126 06/10/24  0451 06/11/24  0529   HGB 11.2* 10.2* 10.7*   HCT 35.1* 31.5* 32.9*    220 280     ID:  Recent Labs     06/09/24  0126 06/10/24  0451 06/11/24  0529   WBC 17.16* 11.72* 10.10     CBG:  Recent Labs     06/09/24  0126 06/10/24  0451 06/11/24  0529   GLUCOSE 135* 91 93      Cardiovascular:  No results for input(s): "TROPONINI", "CKTOTAL", "CKMB", "BNP" in the last 168 hours.  I have reviewed all pertinent lab results within the past 24 hours.    Imaging:  NM Hepatobiliary Scan (HIDA)   Final " Result      No uptake of radiotracer along the gallbladder fossa to suggest a bile leak or biloma.         Electronically signed by: David Guzmán   Date:    06/10/2024   Time:    15:38      CT Previous   Final Result      CT Guided Abscess Drainage With Catheter    (Results Pending)      I have reviewed all pertinent imaging results/findings within the past 24 hours.    Micro/Path/Other:  Microbiology Results (last 7 days)       ** No results found for the last 168 hours. **           Pathology Results  (Last 7 days)      None             Assessment & Plan:   63 y.o. female with history of lap rio on 6/1 with Dr. Hamm presents as transfer from OSH for abdominal pain. Found to have fluid in the gallbladder lori and along the lateral edge of the lvier with WBC elevated at 17. HIDA negative for bile leak.     -IR consult for drainage of fluid collection   -NPO, mIVFs  -PRN pain/nausea control   -Continue Karli Flores MD  [employer] General Surgery PGY-1

## 2024-06-11 NOTE — PLAN OF CARE
06/11/24 1442   Discharge Assessment   Assessment Type Discharge Planning Assessment   Confirmed/corrected address, phone number and insurance Yes   Confirmed Demographics Correct on Facesheet   Source of Information patient;family   When was your last doctors appointment? 05/15/24   Communicated JENSEN with patient/caregiver Date not available/Unable to determine   Reason For Admission abd pain   People in Home spouse   Do you expect to return to your current living situation? Yes   Do you have help at home or someone to help you manage your care at home? Yes   Who are your caregiver(s) and their phone number(s)? , family as needed   Current cognitive status: Alert/Oriented   Walking or Climbing Stairs Difficulty no   Dressing/Bathing Difficulty no   Home Layout Able to live on 1st floor   Equipment Currently Used at Home none   Readmission within 30 days? Yes   Patient currently being followed by outpatient case management? No   Do you currently have service(s) that help you manage your care at home? No   Do you take prescription medications? Yes   Do you have any problems affording any of your prescribed medications? No   Who is going to help you get home at discharge?    How do you get to doctors appointments? car, drives self   Are you on dialysis? No   Do you take coumadin? No   Discharge Plan A Home   Discharge Plan B Home Health   DME Needed Upon Discharge  none   Discharge Plan discussed with: Spouse/sig other;Patient   Transition of Care Barriers None   Financial Resource Strain   How hard is it for you to pay for the very basics like food, housing, medical care, and heating? Not very   Housing Stability   In the last 12 months, was there a time when you were not able to pay the mortgage or rent on time? N   At any time in the past 12 months, were you homeless or living in a shelter (including now)? N   Transportation Needs   Has the lack of transportation kept you from medical appointments,  meetings, work or from getting things needed for daily living? No   Food Insecurity   Within the past 12 months, you worried that your food would run out before you got the money to buy more. Never true   Within the past 12 months, the food you bought just didn't last and you didn't have money to get more. Never true   Stress   Do you feel stress - tense, restless, nervous, or anxious, or unable to sleep at night because your mind is troubled all the time - these days? Only a littl   Social Isolation   How often do you feel lonely or isolated from those around you?  Never   Alcohol Use   Q1: How often do you have a drink containing alcohol? Never   Q2: How many drinks containing alcohol do you have on a typical day when you are drinking? None   Q3: How often do you have six or more drinks on one occasion? Never   Utilities   In the past 12 months has the electric, gas, oil, or water company threatened to shut off services in your home? No   Health Literacy   How often do you need to have someone help you when you read instructions, pamphlets, or other written material from your doctor or pharmacy? Never     Pharmacy: [facility] [place]  Pt/ denies any dc needs at this time. Pt currently has a drain in abd.

## 2024-06-11 NOTE — PLAN OF CARE
Problem: Adult Inpatient Plan of Care  Goal: Plan of Care Review  6/11/2024 0546 by Tiara Winters RN  Outcome: Progressing  6/11/2024 0542 by Tiara Winters RN  Outcome: Progressing  Goal: Patient-Specific Goal (Individualized)  6/11/2024 0546 by Tiara Winters RN  Outcome: Progressing  6/11/2024 0542 by Tiara Winters RN  Outcome: Progressing  Goal: Absence of Hospital-Acquired Illness or Injury  6/11/2024 0546 by Tiara Winters RN  Outcome: Progressing  6/11/2024 0542 by Tiara Winters RN  Outcome: Progressing  Goal: Optimal Comfort and Wellbeing  6/11/2024 0546 by Tiara Winters RN  Outcome: Progressing  6/11/2024 0542 by Tiara Winters RN  Outcome: Progressing  Goal: Readiness for Transition of Care  6/11/2024 0546 by Tiara Winters RN  Outcome: Progressing  6/11/2024 0542 by Tiara Winters RN  Outcome: Progressing     Problem: Wound  Goal: Optimal Coping  6/11/2024 0546 by Tiara Winters RN  Outcome: Progressing  6/11/2024 0542 by Tiara Winters RN  Outcome: Progressing  Goal: Optimal Functional Ability  6/11/2024 0546 by Tiara Winters RN  Outcome: Progressing  6/11/2024 0542 by Tiara Winters RN  Outcome: Progressing  Goal: Absence of Infection Signs and Symptoms  6/11/2024 0546 by Tiara Winters RN  Outcome: Progressing  6/11/2024 0542 by Tiara Winters RN  Outcome: Progressing  Goal: Improved Oral Intake  6/11/2024 0546 by Tiara Winters RN  Outcome: Progressing  6/11/2024 0542 by Tiara Winters RN  Outcome: Progressing  Goal: Optimal Pain Control and Function  6/11/2024 0546 by Tiara Winters RN  Outcome: Progressing  6/11/2024 0542 by Tiara Winters RN  Outcome: Progressing  Goal: Skin Health and Integrity  6/11/2024 0546 by Tiara Winters RN  Outcome: Progressing  6/11/2024 0542 by Tiara Winters RN  Outcome: Progressing  Goal: Optimal Wound Healing  6/11/2024 0546 by Tiara Winters RN  Outcome: Progressing  6/11/2024 0542 by Tiara Winters, RN  Outcome: Progressing

## 2024-06-11 NOTE — OP NOTE
Radiology Post-Procedure Note    Pre Op Diagnosis: RUQ Collection    Post Op Diagnosis:  As Above    Procedure:  Drain Placement    Procedure performed by: Ozzy Presley M.D.    Written Informed Consent Obtained: Yes    Specimen Removed: YES    Estimated Blood Loss: Minimal    Findings:   Standard CT Drain Placement    Patient tolerated procedure well.    Ozzy Presley MD

## 2024-06-11 NOTE — NURSING
Nurses Note -- 4 Eyes      6/10/2024   7:35 PM      Skin assessed during: Admit      [x] No Altered Skin Integrity Present    []Prevention Measures Documented      [] Yes- Altered Skin Integrity Present or Discovered   [] LDA Added if Not in Epic (Describe Wound)   [] New Altered Skin Integrity was Present on Admit and Documented in LDA   [] Wound Image Taken    Wound Care Consulted? No    Attending Nurse:  Montse ASHTON RN    Second RN/Staff Member:   Elly AVILEZ RN

## 2024-06-12 LAB
ALBUMIN SERPL-MCNC: 2.6 G/DL (ref 3.4–4.8)
ALBUMIN/GLOB SERPL: 0.9 RATIO (ref 1.1–2)
ALP SERPL-CCNC: 105 UNIT/L (ref 40–150)
ALT SERPL-CCNC: 32 UNIT/L (ref 0–55)
ANION GAP SERPL CALC-SCNC: 9 MEQ/L
AST SERPL-CCNC: 18 UNIT/L (ref 5–34)
BASOPHILS # BLD AUTO: 0.02 X10(3)/MCL
BASOPHILS NFR BLD AUTO: 0.2 %
BILIRUB SERPL-MCNC: 0.5 MG/DL
BUN SERPL-MCNC: 8.8 MG/DL (ref 9.8–20.1)
CALCIUM SERPL-MCNC: 8.4 MG/DL (ref 8.4–10.2)
CHLORIDE SERPL-SCNC: 106 MMOL/L (ref 98–107)
CO2 SERPL-SCNC: 21 MMOL/L (ref 23–31)
CREAT SERPL-MCNC: 0.64 MG/DL (ref 0.55–1.02)
CREAT/UREA NIT SERPL: 14
EOSINOPHIL # BLD AUTO: 0.06 X10(3)/MCL (ref 0–0.9)
EOSINOPHIL NFR BLD AUTO: 0.7 %
ERYTHROCYTE [DISTWIDTH] IN BLOOD BY AUTOMATED COUNT: 11.8 % (ref 11.5–17)
GFR SERPLBLD CREATININE-BSD FMLA CKD-EPI: >60 ML/MIN/1.73/M2
GLOBULIN SER-MCNC: 2.8 GM/DL (ref 2.4–3.5)
GLUCOSE SERPL-MCNC: 99 MG/DL (ref 82–115)
HCT VFR BLD AUTO: 30.1 % (ref 37–47)
HGB BLD-MCNC: 9.5 G/DL (ref 12–16)
IMM GRANULOCYTES # BLD AUTO: 0.04 X10(3)/MCL (ref 0–0.04)
IMM GRANULOCYTES NFR BLD AUTO: 0.5 %
LYMPHOCYTES # BLD AUTO: 0.91 X10(3)/MCL (ref 0.6–4.6)
LYMPHOCYTES NFR BLD AUTO: 11.3 %
MAGNESIUM SERPL-MCNC: 1.8 MG/DL (ref 1.6–2.6)
MCH RBC QN AUTO: 31.9 PG (ref 27–31)
MCHC RBC AUTO-ENTMCNC: 31.6 G/DL (ref 33–36)
MCV RBC AUTO: 101 FL (ref 80–94)
MONOCYTES # BLD AUTO: 1 X10(3)/MCL (ref 0.1–1.3)
MONOCYTES NFR BLD AUTO: 12.4 %
NEUTROPHILS # BLD AUTO: 6.05 X10(3)/MCL (ref 2.1–9.2)
NEUTROPHILS NFR BLD AUTO: 74.9 %
NRBC BLD AUTO-RTO: 0 %
PHOSPHATE SERPL-MCNC: 2.5 MG/DL (ref 2.3–4.7)
PLATELET # BLD AUTO: 228 X10(3)/MCL (ref 130–400)
PMV BLD AUTO: 10.2 FL (ref 7.4–10.4)
POTASSIUM SERPL-SCNC: 3.2 MMOL/L (ref 3.5–5.1)
PROT SERPL-MCNC: 5.4 GM/DL (ref 5.8–7.6)
RBC # BLD AUTO: 2.98 X10(6)/MCL (ref 4.2–5.4)
SODIUM SERPL-SCNC: 136 MMOL/L (ref 136–145)
WBC # SPEC AUTO: 8.08 X10(3)/MCL (ref 4.5–11.5)

## 2024-06-12 PROCEDURE — 80053 COMPREHEN METABOLIC PANEL: CPT

## 2024-06-12 PROCEDURE — 25000003 PHARM REV CODE 250

## 2024-06-12 PROCEDURE — 63600175 PHARM REV CODE 636 W HCPCS

## 2024-06-12 PROCEDURE — 84100 ASSAY OF PHOSPHORUS: CPT

## 2024-06-12 PROCEDURE — 36415 COLL VENOUS BLD VENIPUNCTURE: CPT

## 2024-06-12 PROCEDURE — 99900035 HC TECH TIME PER 15 MIN (STAT)

## 2024-06-12 PROCEDURE — 83735 ASSAY OF MAGNESIUM: CPT

## 2024-06-12 PROCEDURE — 11000001 HC ACUTE MED/SURG PRIVATE ROOM

## 2024-06-12 PROCEDURE — 94760 N-INVAS EAR/PLS OXIMETRY 1: CPT

## 2024-06-12 PROCEDURE — 85025 COMPLETE CBC W/AUTO DIFF WBC: CPT

## 2024-06-12 RX ORDER — POTASSIUM CHLORIDE 14.9 MG/ML
40 INJECTION INTRAVENOUS ONCE
Status: COMPLETED | OUTPATIENT
Start: 2024-06-12 | End: 2024-06-12

## 2024-06-12 RX ORDER — MAGNESIUM SULFATE HEPTAHYDRATE 40 MG/ML
2 INJECTION, SOLUTION INTRAVENOUS ONCE
Status: COMPLETED | OUTPATIENT
Start: 2024-06-12 | End: 2024-06-12

## 2024-06-12 RX ADMIN — PIPERACILLIN SODIUM AND TAZOBACTAM SODIUM 4.5 G: 4; .5 INJECTION, POWDER, LYOPHILIZED, FOR SOLUTION INTRAVENOUS at 05:06

## 2024-06-12 RX ADMIN — POTASSIUM CHLORIDE 40 MEQ: 14.9 INJECTION, SOLUTION INTRAVENOUS at 03:06

## 2024-06-12 RX ADMIN — POLYETHYLENE GLYCOL 3350 17 G: 17 POWDER, FOR SOLUTION ORAL at 09:06

## 2024-06-12 RX ADMIN — LISINOPRIL 10 MG: 10 TABLET ORAL at 09:06

## 2024-06-12 RX ADMIN — MAGNESIUM SULFATE HEPTAHYDRATE 2 G: 40 INJECTION, SOLUTION INTRAVENOUS at 10:06

## 2024-06-12 RX ADMIN — ACETAMINOPHEN 650 MG: 325 TABLET, FILM COATED ORAL at 05:06

## 2024-06-12 RX ADMIN — ENOXAPARIN SODIUM 40 MG: 40 INJECTION SUBCUTANEOUS at 05:06

## 2024-06-12 RX ADMIN — DOCUSATE SODIUM 100 MG: 100 CAPSULE, LIQUID FILLED ORAL at 09:06

## 2024-06-12 NOTE — PROGRESS NOTES
"   Acute Care Surgery   Progress Note  Admit Date: 6/8/2024  HD#3  POD#* No surgery found *    Subjective:   Interval history:  AF HDS   Pain improved after IR drain placement yesterday   Tolerated diet without any nausea or vomiting   + Flatus, no BM  Ambulating without issue     Home Meds:  Current Outpatient Medications   Medication Instructions    ALPRAZolam (XANAX) 0.25 mg, Oral, 2 times daily    docusate sodium (COLACE) 100 mg, Oral, 2 times daily    estrogens (conjugated) (PREMARIN) 0.625 mg, Oral, Daily    lisinopriL 10 mg, Oral, Daily    polyethylene glycol (GLYCOLAX) 17 g, Oral, As needed (PRN)      Scheduled Meds:   acetaminophen  650 mg Oral Q6H    docusate sodium  100 mg Oral BID    enoxparin  40 mg Subcutaneous Daily    lisinopriL  10 mg Oral Daily    magnesium sulfate IVPB  2 g Intravenous Once    piperacillin-tazobactam (Zosyn) IV (PEDS and ADULTS) (extended infusion is not appropriate)  4.5 g Intravenous Q8H    polyethylene glycol  17 g Oral Daily    potassium chloride  40 mEq Intravenous Once     Continuous Infusions:   sodium chloride 0.9%   Intravenous Continuous   Stopped at 06/10/24 1036     PRN Meds:  Current Facility-Administered Medications:     hydrALAZINE, 10 mg, Intravenous, Q6H PRN    melatonin, 6 mg, Oral, Nightly PRN    morphine, 2 mg, Intravenous, Q6H PRN    ondansetron, 4 mg, Intravenous, Q4H PRN    oxyCODONE, 5 mg, Oral, Q4H PRN    oxyCODONE, 10 mg, Oral, Q4H PRN     Objective:     VITAL SIGNS: 24 HR MIN & MAX LAST   Temp  Min: 98.1 °F (36.7 °C)  Max: 98.9 °F (37.2 °C)  98.9 °F (37.2 °C)   BP  Min: 118/61  Max: 159/66  137/69    Pulse  Min: 81  Max: 107  99    Resp  Min: 15  Max: 21  18    SpO2  Min: 97 %  Max: 100 %  97 %      HT: 5' 7" (170.2 cm)  WT: 49.9 kg (110 lb)  BMI: 17.2     Intake/output:  Intake/Output - Last 3 Shifts         06/10 0700  06/11 0659 06/11 0700  06/12 0659 06/12 0700  06/13 0659    P.O. 240 200     I.V. (mL/kg) 2355.3 (47.2)      IV Piggyback 479.8 299.1  " "   Total Intake(mL/kg) 3075.1 (61.6) 499.1 (10)     Urine (mL/kg/hr) 1200 (1) 300 (0.3)     Drains  55     Stool 0 0     Total Output 1200 355     Net +1875.1 +144.1            Stool Occurrence 0 x 1 x             Intake/Output Summary (Last 24 hours) at 6/12/2024 0720  Last data filed at 6/11/2024 1819  Gross per 24 hour   Intake 499.1 ml   Output 355 ml   Net 144.1 ml           Lines/drains/airway:       Peripheral IV - Single Lumen 06/08/24 22 G Left Antecubital (Active)   Site Assessment Clean;Dry;Intact;No redness;No swelling 06/09/24 2100   Extremity Assessment Distal to IV No abnormal discoloration;No redness;No swelling;No warmth 06/09/24 2100   Line Status Infusing 06/09/24 2100   Number of days: 2       Physical examination:  Gen: NAD, AAOx3, answering questions appropriately  HEENT: atraumatic, NG in place   CV: RR  Resp: NWOB  Abd: S/ND, moderately tender to RUQ and epigastric region, incisions clean, dry, intact   Ext: moving all extremities spontaneously and purposefully  Neuro: CN II-XII grossly intact  Skin/wounds: WWP, no wounds     Labs:  Renal:  Recent Labs     06/10/24  0451 06/11/24  0529 06/12/24  0510   BUN 9.0* 9.5* 8.8*   CREATININE 0.70 0.69 0.64     No results for input(s): "LACTIC" in the last 72 hours.  FENGI:  Recent Labs     06/10/24  0451 06/11/24  0529 06/12/24  0510    138 136   K 3.9 3.5 3.2*    105 106   CO2 25 22* 21*   CALCIUM 8.5 9.1 8.4   MG 2.00 1.90 1.80   PHOS 2.8 2.5 2.5   ALBUMIN 2.8* 3.1* 2.6*   BILITOT 0.6 0.7 0.5   AST 24 22 18   ALKPHOS 118 129 105   ALT 49 46 32     Heme:  Recent Labs     06/10/24  0451 06/11/24  0529 06/12/24  0510   HGB 10.2* 10.7* 9.5*   HCT 31.5* 32.9* 30.1*    280 228     ID:  Recent Labs     06/10/24  0451 06/11/24  0529 06/12/24  0510   WBC 11.72* 10.10 8.08     CBG:  Recent Labs     06/10/24  0451 06/11/24  0529 06/12/24  0510   GLUCOSE 91 93 99      Cardiovascular:  No results for input(s): "TROPONINI", "CKTOTAL", "CKMB", " ""BNP" in the last 168 hours.  I have reviewed all pertinent lab results within the past 24 hours.    Imaging:  CT Guided Abscess Drainage With Catheter   Final Result      Successful placement of a 10 Scottish drain into the right upper quadrant bilious collection.  Samples were collected and sent to the lab for analysis.         Electronically signed by: Ozzy Presley   Date:    06/11/2024   Time:    14:01      NM Hepatobiliary Scan (HIDA)   Final Result      No uptake of radiotracer along the gallbladder fossa to suggest a bile leak or biloma.         Electronically signed by: David Guzmán   Date:    06/10/2024   Time:    15:38      CT Previous   Final Result         I have reviewed all pertinent imaging results/findings within the past 24 hours.    Micro/Path/Other:  Microbiology Results (last 7 days)       Procedure Component Value Units Date/Time    Body Fluid Culture [[id number]] Collected: 06/11/24 1255    Order Status: Completed Specimen: Body Fluid from Gallbladder Updated: 06/12/24 0655     Body Fluid Culture No Growth At 24 Hours    Gram Stain [[id number]] Collected: 06/11/24 1255    Order Status: Completed Specimen: Abscess from Gallbladder Updated: 06/11/24 1438     GRAM STAIN No WBCs, No bacteria seen    Anaerobic Culture [[id number]] Collected: 06/11/24 1255    Order Status: Sent Specimen: Abscess from Gallbladder Updated: 06/11/24 1321    Fungal Culture [[id number]] Collected: 06/11/24 1255    Order Status: Sent Specimen: Abscess from Gallbladder Updated: 06/11/24 1321           Pathology Results  (Last 7 days)      None             Assessment & Plan:   63 y.o. female with history of lap rio on 6/1 with Dr. Hamm presents as transfer from OSH for abdominal pain. Found to have fluid in the gallbladder lori and along the lateral edge of the lvier with WBC elevated at 17. HIDA negative for bile leak.     - Okay for diet  - Consult GI for eval for possible ERCP due to bilious appearing drain output " that has put out about 120cc   - PRN pain/nausea control   - Continue Zosyn  - Strict I/O for drain output     Debra Flores MD  [employer] General Surgery PGY-1

## 2024-06-12 NOTE — CONSULTS
Gastroenterology Consult    Reason for Consult:     Possible bile leak s/p cholecystectomy    HPI:  Yana Rand is a 63 y.o. female known to Dr. Broderick Branch only from recent inpatient consult.  She has a past medical history including anxiety and hysterectomy.  She presented to the ER 05/29/2024 with a chief complaint of right upper quadrant abdominal pain for 2 days with elevated liver enzymes.  CT abdomen/pelvis with IV contrast on 05/29/2024 revealed a distended gallbladder with numerous gallstones.  CBD was not dilated, lipase, and T bili were within normal limits.  We attempted to get an MRCP, but the MR machine was broken that week.  Her liver enzymes trended down, INR continued to be normal.  The patient underwent lap rio on 06/01/2024.  GI signed off.  She was discharged 06/02/2024.    The patient had gone home and was in her usual state of health postoperatively until acutely 06/08/2024 she began to have a recurrence of right upper quadrant abdominal pain while taking a shower. She presented to the ER at [facility]/08/2024.  CT abdomen/pelvis with IV contrast revealed a loculated fluid collection 6.5 x 3.3 cm in the gallbladder fossa suspicious for biloma versus hematoma.  She was transferred to this facility on 06/09/2024.  She had a leukocytosis of 17,000, T bili still WNL 0.4, ALP also .  AST 32, ALT 72.  General surgery was consulted, HIDA scan was obtained 06/10/2024 and was negative for bile leak or biloma.    She had a CT-guided drain placement per IR yesterday 06/11/2024 with removal of 70 cc of bilious appearing fluid, and a 10 Lao drain was placed.  She has been treated with Zosyn since admission with improvement of her leukocytosis.  Fluid cultures so far are negative. VSS, afebrile. GI has been consulted again this admisison 6/12/24 to consider ERCP w/ stenting for suspicion of bile leak.     She presently feels well. Pain has resolved, VSS.  at bedside.  She is tolerating PO. Drain output continues to appear bilious. Last night there was 40 cc's in drain, then it was emptied again today with 20 ccs, now there are a few cc's again in the drain. She is ambulating, passing flatus, in NAD.     PCP:  Niall Lambert MD    Review of patient's allergies indicates:   Allergen Reactions    Bactrim [sulfamethoxazole-trimethoprim]     Milk containing products (dairy)        Current Facility-Administered Medications   Medication Dose Route Frequency Provider Last Rate Last Admin    0.9%  NaCl infusion   Intravenous Continuous Esme Reis MD   Stopped at 06/10/24 1036    acetaminophen tablet 650 mg  650 mg Oral Q6H Esme Reis MD        docusate sodium capsule 100 mg  100 mg Oral BID Esme Reis MD   100 mg at 06/10/24 2043    enoxaparin injection 40 mg  40 mg Subcutaneous Daily Esme Reis MD   40 mg at 06/11/24 1652    hydrALAZINE injection 10 mg  10 mg Intravenous Q6H PRN Esme Reis MD        lisinopriL tablet 10 mg  10 mg Oral Daily Esme Reis MD   10 mg at 06/09/24 0900    magnesium sulfate 2g in water 50mL IVPB (premix)  2 g Intravenous Once Debra Flores MD        melatonin tablet 6 mg  6 mg Oral Nightly PRN Esme Reis MD        morphine injection 2 mg  2 mg Intravenous Q6H PRN Esme Reis MD        ondansetron injection 4 mg  4 mg Intravenous Q4H PRN Esme Reis MD        oxyCODONE immediate release tablet 5 mg  5 mg Oral Q4H PRN Esme Reis MD        oxyCODONE immediate release tablet Tab 10 mg  10 mg Oral Q4H PRN Esme Reis MD   10 mg at 06/11/24 1341    piperacillin-tazobactam (ZOSYN) 4.5 g in dextrose 5 % in water (D5W) 100 mL IVPB (MB+)  4.5 g Intravenous Q8H Esme Reis MD   Stopped at 06/12/24 0629    polyethylene glycol packet 17 g  17 g Oral Daily Esme Reis MD   17 g at 06/09/24 0900    potassium chloride 20 mEq in 100 mL IVPB (FOR CENTRAL LINE ADMINISTRATION ONLY)  40 mEq Intravenous Once Sandra  MD Debra         Medications Prior to Admission   Medication Sig Dispense Refill Last Dose    estrogens, conjugated, (PREMARIN) 0.625 MG tablet Take 0.625 mg by mouth once daily.   2024    ALPRAZolam (XANAX) 0.25 MG tablet Take 0.25 mg by mouth 2 (two) times a day.   Unknown    docusate sodium (COLACE) 100 MG capsule Take 1 capsule (100 mg total) by mouth 2 (two) times daily.       lisinopriL 10 MG tablet Take 1 tablet (10 mg total) by mouth once daily. 90 tablet 3     [] oxyCODONE-acetaminophen (PERCOCET)  mg per tablet Take 1 tablet by mouth every 4 (four) hours as needed for Pain. 15 tablet 0     polyethylene glycol (GLYCOLAX) 17 gram/dose powder Take 17 g by mouth as needed for Constipation.   Unknown       Past Medical History:  No past medical history on file.    Past Surgical History:  Past Surgical History:   Procedure Laterality Date    LAPAROSCOPIC CHOLECYSTECTOMY N/A 2024    Procedure: CHOLECYSTECTOMY, LAPAROSCOPIC;  Surgeon: Junito Hamm MD;  Location: [facility];  Service: General;  Laterality: N/A;       Family History:  No family history on file.    Social History:  Social History     Tobacco Use    Smoking status: Not on file    Smokeless tobacco: Not on file   Substance Use Topics    Alcohol use: Not on file           Review of Systems:    Review of Systems   Constitutional:  Negative for fever and unexpected weight change.   Respiratory:  Negative for cough and shortness of breath.    Cardiovascular:  Negative for chest pain and leg swelling.   Gastrointestinal:  Negative for abdominal pain, blood in stool, diarrhea, nausea and vomiting.   Musculoskeletal:  Negative for back pain and myalgias.   Skin:  Negative for color change and pallor.   Neurological:  Negative for speech difficulty and weakness.   All other systems reviewed and are negative.      Objective:      VITAL SIGNS: 24 HR MIN & MAX LAST  Temp  Min: 98.1 °F (36.7 °C)  Max: 98.9 °F (37.2 °C) 98.2 °F (36.8 °C)  BP   Min: 118/61  Max: 159/66 (!) 150/67  Pulse  Min: 88  Max: 107 88  Resp  Min: 15  Max: 21 20  SpO2  Min: 97 %  Max: 100 % 98 %      Intake/Output Summary (Last 24 hours) at 6/12/2024 0852  Last data filed at 6/12/2024 0520  Gross per 24 hour   Intake 869.1 ml   Output 2005 ml   Net -1135.9 ml       Physical Exam  Vitals and nursing note reviewed.   Constitutional:       Appearance: Normal appearance.   HENT:      Head: Normocephalic and atraumatic.   Eyes:      General: No scleral icterus.     Extraocular Movements: Extraocular movements intact.   Cardiovascular:      Rate and Rhythm: Normal rate and regular rhythm.      Heart sounds: Normal heart sounds.   Pulmonary:      Effort: Pulmonary effort is normal. No respiratory distress.      Breath sounds: Normal breath sounds.   Abdominal:      General: Abdomen is flat. Bowel sounds are normal. There is no distension.      Palpations: Abdomen is soft.      Tenderness: There is no abdominal tenderness.      Comments: RUQ LIDIA drain with frankly bilious output. Nonbloody. Abd soft/nontender   Musculoskeletal:         General: No swelling or deformity. Normal range of motion.      Right lower leg: No edema.      Left lower leg: No edema.   Skin:     General: Skin is warm and dry.   Neurological:      General: No focal deficit present.      Mental Status: She is alert and oriented to person, place, and time.   Psychiatric:         Mood and Affect: Mood normal.         Behavior: Behavior normal.         Recent Results (from the past 48 hour(s))   Comprehensive Metabolic Panel    Collection Time: 06/11/24  5:29 AM   Result Value Ref Range    Sodium 138 136 - 145 mmol/L    Potassium 3.5 3.5 - 5.1 mmol/L    Chloride 105 98 - 107 mmol/L    CO2 22 (L) 23 - 31 mmol/L    Glucose 93 82 - 115 mg/dL    Blood Urea Nitrogen 9.5 (L) 9.8 - 20.1 mg/dL    Creatinine 0.69 0.55 - 1.02 mg/dL    Calcium 9.1 8.4 - 10.2 mg/dL    Protein Total 6.3 5.8 - 7.6 gm/dL    Albumin 3.1 (L) 3.4 - 4.8 g/dL     Globulin 3.2 2.4 - 3.5 gm/dL    Albumin/Globulin Ratio 1.0 (L) 1.1 - 2.0 ratio    Bilirubin Total 0.7 <=1.5 mg/dL     40 - 150 unit/L    ALT 46 0 - 55 unit/L    AST 22 5 - 34 unit/L    eGFR >60 mL/min/1.73/m2    Anion Gap 11.0 mEq/L    BUN/Creatinine Ratio 14    Magnesium    Collection Time: 06/11/24  5:29 AM   Result Value Ref Range    Magnesium Level 1.90 1.60 - 2.60 mg/dL   Phosphorus    Collection Time: 06/11/24  5:29 AM   Result Value Ref Range    Phosphorus Level 2.5 2.3 - 4.7 mg/dL   CBC with Differential    Collection Time: 06/11/24  5:29 AM   Result Value Ref Range    WBC 10.10 4.50 - 11.50 x10(3)/mcL    RBC 3.29 (L) 4.20 - 5.40 x10(6)/mcL    Hgb 10.7 (L) 12.0 - 16.0 g/dL    Hct 32.9 (L) 37.0 - 47.0 %    .0 (H) 80.0 - 94.0 fL    MCH 32.5 (H) 27.0 - 31.0 pg    MCHC 32.5 (L) 33.0 - 36.0 g/dL    RDW 11.9 11.5 - 17.0 %    Platelet 280 130 - 400 x10(3)/mcL    MPV 10.7 (H) 7.4 - 10.4 fL    Neut % 73.1 %    Lymph % 13.1 %    Mono % 12.0 %    Eos % 0.7 %    Basophil % 0.3 %    Lymph # 1.32 0.6 - 4.6 x10(3)/mcL    Neut # 7.39 2.1 - 9.2 x10(3)/mcL    Mono # 1.21 0.1 - 1.3 x10(3)/mcL    Eos # 0.07 0 - 0.9 x10(3)/mcL    Baso # 0.03 <=0.2 x10(3)/mcL    IG# 0.08 (H) 0 - 0.04 x10(3)/mcL    IG% 0.8 %    NRBC% 0.0 %   Gram Stain    Collection Time: 06/11/24 12:55 PM    Specimen: Gallbladder; Abscess   Result Value Ref Range    GRAM STAIN No WBCs, No bacteria seen    Body Fluid Culture    Collection Time: 06/11/24 12:55 PM    Specimen: Gallbladder; Body Fluid   Result Value Ref Range    Body Fluid Culture No Growth At 24 Hours    Comprehensive Metabolic Panel    Collection Time: 06/12/24  5:10 AM   Result Value Ref Range    Sodium 136 136 - 145 mmol/L    Potassium 3.2 (L) 3.5 - 5.1 mmol/L    Chloride 106 98 - 107 mmol/L    CO2 21 (L) 23 - 31 mmol/L    Glucose 99 82 - 115 mg/dL    Blood Urea Nitrogen 8.8 (L) 9.8 - 20.1 mg/dL    Creatinine 0.64 0.55 - 1.02 mg/dL    Calcium 8.4 8.4 - 10.2 mg/dL    Protein Total  5.4 (L) 5.8 - 7.6 gm/dL    Albumin 2.6 (L) 3.4 - 4.8 g/dL    Globulin 2.8 2.4 - 3.5 gm/dL    Albumin/Globulin Ratio 0.9 (L) 1.1 - 2.0 ratio    Bilirubin Total 0.5 <=1.5 mg/dL     40 - 150 unit/L    ALT 32 0 - 55 unit/L    AST 18 5 - 34 unit/L    eGFR >60 mL/min/1.73/m2    Anion Gap 9.0 mEq/L    BUN/Creatinine Ratio 14    Magnesium    Collection Time: 06/12/24  5:10 AM   Result Value Ref Range    Magnesium Level 1.80 1.60 - 2.60 mg/dL   Phosphorus    Collection Time: 06/12/24  5:10 AM   Result Value Ref Range    Phosphorus Level 2.5 2.3 - 4.7 mg/dL   CBC with Differential    Collection Time: 06/12/24  5:10 AM   Result Value Ref Range    WBC 8.08 4.50 - 11.50 x10(3)/mcL    RBC 2.98 (L) 4.20 - 5.40 x10(6)/mcL    Hgb 9.5 (L) 12.0 - 16.0 g/dL    Hct 30.1 (L) 37.0 - 47.0 %    .0 (H) 80.0 - 94.0 fL    MCH 31.9 (H) 27.0 - 31.0 pg    MCHC 31.6 (L) 33.0 - 36.0 g/dL    RDW 11.8 11.5 - 17.0 %    Platelet 228 130 - 400 x10(3)/mcL    MPV 10.2 7.4 - 10.4 fL    Neut % 74.9 %    Lymph % 11.3 %    Mono % 12.4 %    Eos % 0.7 %    Basophil % 0.2 %    Lymph # 0.91 0.6 - 4.6 x10(3)/mcL    Neut # 6.05 2.1 - 9.2 x10(3)/mcL    Mono # 1.00 0.1 - 1.3 x10(3)/mcL    Eos # 0.06 0 - 0.9 x10(3)/mcL    Baso # 0.02 <=0.2 x10(3)/mcL    IG# 0.04 0 - 0.04 x10(3)/mcL    IG% 0.5 %    NRBC% 0.0 %       CT Guided Abscess Drainage With Catheter    Result Date: 6/11/2024  EXAMINATION: CT GUIDED ABSCESS DRAINAGE WITH CATHETER CLINICAL HISTORY: intra abdominal fluid collection; TECHNIQUE: CT guided drainage of a right upper quadrant collection. DLP: 267 COMPARISON: No relevant prior available for comparison. FINDINGS: The risks, benefits, and potential complications were discussed including bleeding, infection, end organ damage,.  Informed consent was obtained. The patient was positioned in the supine position. The right upper quadrant collection was localized with CT.  The skin was marked and the area was prepped and draped in a sterile fashion.  The site was anesthetized with 1% lidocaine solution. A 19 gauge, 5 cm coaxial needle was advanced into the collection, and a guidewire was then placed through the needle. The track was then dilated and a 10 Malaysian drain was placed. The drain was sutured to the skin and attached to a LIDIA bulb. There was 70 cc of bilious fluid drained. The patient tolerated the procedure without difficulty. Post-procedure imaging demonstrated a well-positioned catheter.  There were no immediate post-procedure complications. The patient was discharged to her room in stable condition.     Successful placement of a 10 Malaysian drain into the right upper quadrant bilious collection.  Samples were collected and sent to the lab for analysis. Electronically signed by: Ozzy Presley Date:    06/11/2024 Time:    14:01    NM Hepatobiliary Scan (HIDA)    Result Date: 6/10/2024  EXAMINATION: NM HEPATOBILIARY IMAGING INC GB (HIDA) CLINICAL HISTORY: Possible biloma; COMPARISON: CT 8 June 2024 FINDINGS: Radiopharmaceutical 8.8 mCi non-HEU Tc99m Choletec IV. There is prompt uptake of radiotracer by the liver.  No uptake of radiotracer along the gallbladder fossa is seen.  There is biliary and small bowel activity.  There is adequate clearance of activity from the liver.     No uptake of radiotracer along the gallbladder fossa to suggest a bile leak or biloma. Electronically signed by: David Guzmán Date:    06/10/2024 Time:    15:38    SURG [place] Surgery Fluoro Usage    Result Date: 6/1/2024  See OP Notes for results. IMPRESSION: See OP Notes for results. This procedure was auto-finalized by: Virtual Radiologist    US Abdomen Limited_Liver    Result Date: 5/29/2024  EXAMINATION: US ABDOMEN LIMITED_LIVER CLINICAL HISTORY: RUQ pain; COMPARISON: CT earlier today. FINDINGS: Grayscale, color and spectral doppler evaluation of the right upper quadrant. No focal abnormality of limited visualized pancreas. Imaged portion of the IVC is normal in caliber. Liver is  not significantly enlarged. No focal liver lesion. Normal hepatopetal flow is noted in the portal vein. There are gallstones.  The gallbladder is distended and there is trace pericholecystic fluid.  Sonographic Rose sign was negative.  The common bile duct is mildly dilated measuring up to 8 mm. Right kidney measures 9 cm in length. No hydronephrosis.     1. Cholelithiasis with distended gallbladder but negative sonographic Rose sign.  If there remains clinical suspicion of acute cholecystitis would recommend correlation with nuclear medicine HIDA scan. 2. Mild extrahepatic biliary ductal dilatation. Electronically signed by: David Guzmán Date:    05/29/2024 Time:    16:22    CT Abdomen Pelvis With IV Contrast NO Oral Contrast    Result Date: 5/29/2024  EXAMINATION: CT ABDOMEN PELVIS WITH IV CONTRAST CLINICAL HISTORY: transaminitis; TECHNIQUE: CT imaging was performed of the abdomen and pelvis after the administration of intravenous contrast. Dose length product is 188 mGycm. Automatic exposure control, adjustment of mA/kV or iterative reconstruction technique was used to limit radiation dose. COMPARISON: None FINDINGS: Liver: There are multiple scattered small hepatic hypodensities measuring up to 7 mm in size. Gallbladder and biliary tree: Gallbladder is distended with numerous calcified gallstones.  No intra or extrahepatic biliary ductal dilation. Pancreas: Normal. Spleen: Normal. Adrenals: Normal. Kidneys and ureters: Bilateral renal cysts.  No hydronephrosis. Bladder: Normal. Reproductive organs: The uterus has been surgically resected.  A 2 cm right-sided Bartholin's gland cyst is noted. Stomach/bowel: No evidence of bowel obstruction. Appendix is normal. No discernible bowel inflammation. Lymph nodes: No pathologically enlarged lymph node identified. Peritoneum: No ascites or free air. No fluid collection. Vessels: Mild scattered vascular calcifications. Abdominal wall: Normal. Lung bases: No  consolidation or pleural effusion. Bones: No acute osseous findings.     Distended gallbladder with numerous calcified gallstones.  Right upper quadrant ultrasound is available for correlation. Electronically signed by: Krista Pal Date:    05/29/2024 Time:    13:47        Assessment & Plan:     63 y.o. female known to Dr. Broderick Branch only from recent inpatient consult.  She has a past medical history including anxiety and hysterectomy, admitted two weeks ago for calculus cholecystitis, s/p lap rio 6/1/24. She was recovering well at home until 6/8 when she had an acute recurrence of abd pain, CT confirming of a fluid collection. HIDA negative for bile leak,  but IR placed a drain 6/11 which has been putting out bile. GI consulted for bile leak.     Post-cholecystectomy bile leak  - confirmed by LIDIA drain output continuing to drain bile  - continue abx coverage  - pt will need ERCP for biliary stenting  - continue PO intake today  - NPO after midnight, will discuss plan for ERCP with GI team    Thank you for allowing us to participate in this patient's care.    Briseyda Hameed PA-C  [facility], [facility]

## 2024-06-13 ENCOUNTER — ANESTHESIA EVENT (OUTPATIENT)
Dept: ENDOSCOPY | Facility: HOSPITAL | Age: 64
DRG: 394 | End: 2024-06-13
Payer: COMMERCIAL

## 2024-06-13 ENCOUNTER — ANESTHESIA (OUTPATIENT)
Dept: ENDOSCOPY | Facility: HOSPITAL | Age: 64
DRG: 394 | End: 2024-06-13
Payer: COMMERCIAL

## 2024-06-13 VITALS
TEMPERATURE: 98 F | SYSTOLIC BLOOD PRESSURE: 143 MMHG | HEART RATE: 70 BPM | WEIGHT: 110 LBS | HEIGHT: 65 IN | DIASTOLIC BLOOD PRESSURE: 71 MMHG | OXYGEN SATURATION: 99 % | RESPIRATION RATE: 16 BRPM | BODY MASS INDEX: 18.33 KG/M2

## 2024-06-13 LAB
ALBUMIN SERPL-MCNC: 2.5 G/DL (ref 3.4–4.8)
ALBUMIN/GLOB SERPL: 0.9 RATIO (ref 1.1–2)
ALP SERPL-CCNC: 103 UNIT/L (ref 40–150)
ALT SERPL-CCNC: 34 UNIT/L (ref 0–55)
ANION GAP SERPL CALC-SCNC: 6 MEQ/L
AST SERPL-CCNC: 29 UNIT/L (ref 5–34)
BASOPHILS # BLD AUTO: 0.02 X10(3)/MCL
BASOPHILS NFR BLD AUTO: 0.4 %
BILIRUB SERPL-MCNC: 0.5 MG/DL
BUN SERPL-MCNC: 5.9 MG/DL (ref 9.8–20.1)
CALCIUM SERPL-MCNC: 8.5 MG/DL (ref 8.4–10.2)
CHLORIDE SERPL-SCNC: 107 MMOL/L (ref 98–107)
CO2 SERPL-SCNC: 26 MMOL/L (ref 23–31)
CREAT SERPL-MCNC: 0.65 MG/DL (ref 0.55–1.02)
CREAT/UREA NIT SERPL: 9
EOSINOPHIL # BLD AUTO: 0.09 X10(3)/MCL (ref 0–0.9)
EOSINOPHIL NFR BLD AUTO: 1.7 %
ERYTHROCYTE [DISTWIDTH] IN BLOOD BY AUTOMATED COUNT: 11.7 % (ref 11.5–17)
GFR SERPLBLD CREATININE-BSD FMLA CKD-EPI: >60 ML/MIN/1.73/M2
GLOBULIN SER-MCNC: 2.7 GM/DL (ref 2.4–3.5)
GLUCOSE SERPL-MCNC: 97 MG/DL (ref 82–115)
HCT VFR BLD AUTO: 29.3 % (ref 37–47)
HGB BLD-MCNC: 9.4 G/DL (ref 12–16)
IMM GRANULOCYTES # BLD AUTO: 0.04 X10(3)/MCL (ref 0–0.04)
IMM GRANULOCYTES NFR BLD AUTO: 0.7 %
LYMPHOCYTES # BLD AUTO: 0.86 X10(3)/MCL (ref 0.6–4.6)
LYMPHOCYTES NFR BLD AUTO: 16.1 %
MAGNESIUM SERPL-MCNC: 2.2 MG/DL (ref 1.6–2.6)
MCH RBC QN AUTO: 32.2 PG (ref 27–31)
MCHC RBC AUTO-ENTMCNC: 32.1 G/DL (ref 33–36)
MCV RBC AUTO: 100.3 FL (ref 80–94)
MONOCYTES # BLD AUTO: 0.62 X10(3)/MCL (ref 0.1–1.3)
MONOCYTES NFR BLD AUTO: 11.6 %
NEUTROPHILS # BLD AUTO: 3.72 X10(3)/MCL (ref 2.1–9.2)
NEUTROPHILS NFR BLD AUTO: 69.5 %
NRBC BLD AUTO-RTO: 0 %
PHOSPHATE SERPL-MCNC: 2.5 MG/DL (ref 2.3–4.7)
PLATELET # BLD AUTO: 248 X10(3)/MCL (ref 130–400)
PMV BLD AUTO: 11 FL (ref 7.4–10.4)
POTASSIUM SERPL-SCNC: 3.7 MMOL/L (ref 3.5–5.1)
PROT SERPL-MCNC: 5.2 GM/DL (ref 5.8–7.6)
RBC # BLD AUTO: 2.92 X10(6)/MCL (ref 4.2–5.4)
SODIUM SERPL-SCNC: 139 MMOL/L (ref 136–145)
WBC # SPEC AUTO: 5.35 X10(3)/MCL (ref 4.5–11.5)

## 2024-06-13 PROCEDURE — C1769 GUIDE WIRE: HCPCS | Performed by: INTERNAL MEDICINE

## 2024-06-13 PROCEDURE — 37000008 HC ANESTHESIA 1ST 15 MINUTES: Performed by: INTERNAL MEDICINE

## 2024-06-13 PROCEDURE — D9220A PRA ANESTHESIA: Mod: ANES,,, | Performed by: STUDENT IN AN ORGANIZED HEALTH CARE EDUCATION/TRAINING PROGRAM

## 2024-06-13 PROCEDURE — 27201423 OPTIME MED/SURG SUP & DEVICES STERILE SUPPLY: Performed by: INTERNAL MEDICINE

## 2024-06-13 PROCEDURE — 36415 COLL VENOUS BLD VENIPUNCTURE: CPT

## 2024-06-13 PROCEDURE — 0F798ZZ DILATION OF COMMON BILE DUCT, VIA NATURAL OR ARTIFICIAL OPENING ENDOSCOPIC: ICD-10-PCS | Performed by: INTERNAL MEDICINE

## 2024-06-13 PROCEDURE — 63600175 PHARM REV CODE 636 W HCPCS: Performed by: NURSE ANESTHETIST, CERTIFIED REGISTERED

## 2024-06-13 PROCEDURE — 0FC98ZZ EXTIRPATION OF MATTER FROM COMMON BILE DUCT, VIA NATURAL OR ARTIFICIAL OPENING ENDOSCOPIC: ICD-10-PCS | Performed by: INTERNAL MEDICINE

## 2024-06-13 PROCEDURE — 63600175 PHARM REV CODE 636 W HCPCS

## 2024-06-13 PROCEDURE — 63600175 PHARM REV CODE 636 W HCPCS: Performed by: INTERNAL MEDICINE

## 2024-06-13 PROCEDURE — 85025 COMPLETE CBC W/AUTO DIFF WBC: CPT

## 2024-06-13 PROCEDURE — 43262 ENDO CHOLANGIOPANCREATOGRAPH: CPT | Performed by: INTERNAL MEDICINE

## 2024-06-13 PROCEDURE — 25000003 PHARM REV CODE 250

## 2024-06-13 PROCEDURE — 37000009 HC ANESTHESIA EA ADD 15 MINS: Performed by: INTERNAL MEDICINE

## 2024-06-13 PROCEDURE — 25000003 PHARM REV CODE 250: Performed by: NURSE ANESTHETIST, CERTIFIED REGISTERED

## 2024-06-13 PROCEDURE — D9220A PRA ANESTHESIA: Mod: CRNA,,, | Performed by: NURSE ANESTHETIST, CERTIFIED REGISTERED

## 2024-06-13 PROCEDURE — BF101ZZ FLUOROSCOPY OF BILE DUCTS USING LOW OSMOLAR CONTRAST: ICD-10-PCS | Performed by: INTERNAL MEDICINE

## 2024-06-13 PROCEDURE — 43264 ERCP REMOVE DUCT CALCULI: CPT | Performed by: INTERNAL MEDICINE

## 2024-06-13 PROCEDURE — 80053 COMPREHEN METABOLIC PANEL: CPT

## 2024-06-13 PROCEDURE — 84100 ASSAY OF PHOSPHORUS: CPT

## 2024-06-13 PROCEDURE — 25500020 PHARM REV CODE 255: Performed by: INTERNAL MEDICINE

## 2024-06-13 PROCEDURE — 83735 ASSAY OF MAGNESIUM: CPT

## 2024-06-13 RX ORDER — PROPOFOL 10 MG/ML
VIAL (ML) INTRAVENOUS
Status: DISCONTINUED | OUTPATIENT
Start: 2024-06-13 | End: 2024-06-13

## 2024-06-13 RX ORDER — SUCCINYLCHOLINE CHLORIDE 20 MG/ML
INJECTION INTRAMUSCULAR; INTRAVENOUS
Status: DISCONTINUED | OUTPATIENT
Start: 2024-06-13 | End: 2024-06-13

## 2024-06-13 RX ORDER — OXYCODONE AND ACETAMINOPHEN 10; 325 MG/1; MG/1
1 TABLET ORAL EVERY 6 HOURS PRN
Qty: 15 TABLET | Refills: 0 | Status: SHIPPED | OUTPATIENT
Start: 2024-06-13 | End: 2024-06-20

## 2024-06-13 RX ORDER — LIDOCAINE HYDROCHLORIDE 20 MG/ML
INJECTION INTRAVENOUS
Status: DISCONTINUED | OUTPATIENT
Start: 2024-06-13 | End: 2024-06-13

## 2024-06-13 RX ORDER — ROCURONIUM BROMIDE 10 MG/ML
INJECTION, SOLUTION INTRAVENOUS
Status: DISCONTINUED | OUTPATIENT
Start: 2024-06-13 | End: 2024-06-13

## 2024-06-13 RX ORDER — INDOMETHACIN 50 MG/1
SUPPOSITORY RECTAL
Status: COMPLETED
Start: 2024-06-13 | End: 2024-06-13

## 2024-06-13 RX ORDER — EPINEPHRINE 0.1 MG/ML
INJECTION INTRAVENOUS
Status: DISCONTINUED
Start: 2024-06-13 | End: 2024-06-13 | Stop reason: WASHOUT

## 2024-06-13 RX ORDER — DEXAMETHASONE SODIUM PHOSPHATE 4 MG/ML
INJECTION, SOLUTION INTRA-ARTICULAR; INTRALESIONAL; INTRAMUSCULAR; INTRAVENOUS; SOFT TISSUE
Status: DISCONTINUED | OUTPATIENT
Start: 2024-06-13 | End: 2024-06-13

## 2024-06-13 RX ORDER — EPINEPHRINE 0.1 MG/ML
INJECTION INTRAVENOUS
Status: DISCONTINUED | OUTPATIENT
Start: 2024-06-13 | End: 2024-06-13 | Stop reason: HOSPADM

## 2024-06-13 RX ORDER — SODIUM CHLORIDE, SODIUM LACTATE, POTASSIUM CHLORIDE, CALCIUM CHLORIDE 600; 310; 30; 20 MG/100ML; MG/100ML; MG/100ML; MG/100ML
INJECTION, SOLUTION INTRAVENOUS CONTINUOUS
Status: DISCONTINUED | OUTPATIENT
Start: 2024-06-13 | End: 2024-06-13 | Stop reason: HOSPADM

## 2024-06-13 RX ORDER — DOCUSATE SODIUM 100 MG/1
100 CAPSULE, LIQUID FILLED ORAL 2 TIMES DAILY PRN
Start: 2024-06-13 | End: 2024-07-13

## 2024-06-13 RX ORDER — INDOMETHACIN 50 MG/1
100 SUPPOSITORY RECTAL SEE ADMIN INSTRUCTIONS
Status: DISCONTINUED | OUTPATIENT
Start: 2024-06-13 | End: 2024-06-13 | Stop reason: HOSPADM

## 2024-06-13 RX ADMIN — INDOMETHACIN 100 MG: 50 SUPPOSITORY RECTAL at 07:06

## 2024-06-13 RX ADMIN — PIPERACILLIN SODIUM AND TAZOBACTAM SODIUM 4.5 G: 4; .5 INJECTION, POWDER, LYOPHILIZED, FOR SOLUTION INTRAVENOUS at 09:06

## 2024-06-13 RX ADMIN — ROCURONIUM BROMIDE 5 MG: 10 SOLUTION INTRAVENOUS at 07:06

## 2024-06-13 RX ADMIN — ROCURONIUM BROMIDE 15 MG: 10 SOLUTION INTRAVENOUS at 07:06

## 2024-06-13 RX ADMIN — DOCUSATE SODIUM 100 MG: 100 CAPSULE, LIQUID FILLED ORAL at 09:06

## 2024-06-13 RX ADMIN — LIDOCAINE HYDROCHLORIDE 40 MG: 20 INJECTION INTRAVENOUS at 07:06

## 2024-06-13 RX ADMIN — PROPOFOL 150 MG: 10 INJECTION, EMULSION INTRAVENOUS at 07:06

## 2024-06-13 RX ADMIN — PIPERACILLIN SODIUM AND TAZOBACTAM SODIUM 4.5 G: 4; .5 INJECTION, POWDER, LYOPHILIZED, FOR SOLUTION INTRAVENOUS at 02:06

## 2024-06-13 RX ADMIN — DEXAMETHASONE SODIUM PHOSPHATE 4 MG: 4 INJECTION, SOLUTION INTRA-ARTICULAR; INTRALESIONAL; INTRAMUSCULAR; INTRAVENOUS; SOFT TISSUE at 07:06

## 2024-06-13 RX ADMIN — LISINOPRIL 10 MG: 10 TABLET ORAL at 09:06

## 2024-06-13 RX ADMIN — SODIUM CHLORIDE, POTASSIUM CHLORIDE, SODIUM LACTATE AND CALCIUM CHLORIDE: 600; 310; 30; 20 INJECTION, SOLUTION INTRAVENOUS at 09:06

## 2024-06-13 RX ADMIN — PROPOFOL 50 MG: 10 INJECTION, EMULSION INTRAVENOUS at 07:06

## 2024-06-13 RX ADMIN — SUCCINYLCHOLINE CHLORIDE 120 MG: 20 INJECTION, SOLUTION INTRAMUSCULAR; INTRAVENOUS at 07:06

## 2024-06-13 NOTE — ANESTHESIA POSTPROCEDURE EVALUATION
Anesthesia Post Evaluation    Patient: Yana Rand    Procedure(s) Performed: Procedure(s) (LRB):  ERCP (N/A)  ERCP, WITH CALCULUS REMOVAL  ERCP, WITH SPHINCTEROTOMY  EGD,WITH HEMORRHAGE CONTROL    Final Anesthesia Type: general      Patient location during evaluation: PACU  Patient participation: Yes- Able to Participate  Level of consciousness: awake and alert  Post-procedure vital signs: reviewed and stable  Pain management: adequate  Airway patency: patent    PONV status at discharge: No PONV  Anesthetic complications: no      Respiratory status: unassisted  Hydration status: euvolemic  Follow-up not needed.              Vitals Value Taken Time   /84 06/13/24 0800   Temp 36.7 °C (98.1 °F) 06/13/24 0745   Pulse 80 06/13/24 0800   Resp 12 06/13/24 0800   SpO2 98 % 06/13/24 0800         Event Time   Out of Recovery 06/13/2024 07:50:00         Pain/Tristen Score: Pain Rating Prior to Med Admin: 4 (6/12/2024  5:18 PM)  Pain Rating Post Med Admin: 0 (6/12/2024  6:18 PM)  Tristen Score: 10 (6/13/2024  7:56 AM)

## 2024-06-13 NOTE — PROGRESS NOTES
"   Acute Care Surgery   Progress Note  Admit Date: 6/8/2024  HD#4  POD#Day of Surgery    Subjective:   Interval history:  AF HDS   Had ERCP w/ GI this AM w/ sphincterotomy and CBD stone removal   Minimal output from LIDIA drain  Tolerating diet having BMs    Home Meds:  Current Outpatient Medications   Medication Instructions    ALPRAZolam (XANAX) 0.25 mg, Oral, 2 times daily    docusate sodium (COLACE) 100 mg, Oral, 2 times daily    estrogens (conjugated) (PREMARIN) 0.625 mg, Oral, Daily    lisinopriL 10 mg, Oral, Daily    polyethylene glycol (GLYCOLAX) 17 g, Oral, As needed (PRN)      Scheduled Meds:   acetaminophen  650 mg Oral Q6H    docusate sodium  100 mg Oral BID    enoxparin  40 mg Subcutaneous Daily    lisinopriL  10 mg Oral Daily    piperacillin-tazobactam (Zosyn) IV (PEDS and ADULTS) (extended infusion is not appropriate)  4.5 g Intravenous Q8H    polyethylene glycol  17 g Oral Daily     Continuous Infusions:   sodium chloride 0.9%   Intravenous Continuous   Stopped at 06/10/24 1036    lactated ringers   Intravenous Continuous 50 mL/hr at 06/13/24 0908 New Bag at 06/13/24 0908     PRN Meds:  Current Facility-Administered Medications:     hydrALAZINE, 10 mg, Intravenous, Q6H PRN    indomethacin, 100 mg, Rectal, See admin instructions    melatonin, 6 mg, Oral, Nightly PRN    morphine, 2 mg, Intravenous, Q6H PRN    ondansetron, 4 mg, Intravenous, Q4H PRN    oxyCODONE, 5 mg, Oral, Q4H PRN    oxyCODONE, 10 mg, Oral, Q4H PRN     Objective:     VITAL SIGNS: 24 HR MIN & MAX LAST   Temp  Min: 97.3 °F (36.3 °C)  Max: 98.3 °F (36.8 °C)  97.6 °F (36.4 °C)   BP  Min: 126/66  Max: 185/92  (!) 162/71    Pulse  Min: 65  Max: 107  76    Resp  Min: 12  Max: 22  16    SpO2  Min: 87 %  Max: 100 %  (!) 87 %      HT: 5' 5" (165.1 cm)  WT: 49.9 kg (110 lb)  BMI: 17.2     Intake/output:  Intake/Output - Last 3 Shifts         06/11 0700 06/12 0659 06/12 0700 06/13 0659 06/13 0700 06/14 0659    P.O. 570 480     I.V. (mL/kg)   200 " "(4)    IV Piggyback 299.1      Total Intake(mL/kg) 869.1 (17.4) 480 (9.6) 200 (4)    Urine (mL/kg/hr) 1800 (1.5) 1150 (1)     Drains 205      Stool 0 1     Total Output 2005 1151     Net -1135.9 -671 +200           Urine Occurrence  1 x     Stool Occurrence 1 x              Intake/Output Summary (Last 24 hours) at 6/13/2024 1225  Last data filed at 6/13/2024 0747  Gross per 24 hour   Intake 680 ml   Output 1151 ml   Net -471 ml           Lines/drains/airway:       Peripheral IV - Single Lumen 06/08/24 22 G Left Antecubital (Active)   Site Assessment Clean;Dry;Intact;No redness;No swelling 06/09/24 2100   Extremity Assessment Distal to IV No abnormal discoloration;No redness;No swelling;No warmth 06/09/24 2100   Line Status Infusing 06/09/24 2100   Number of days: 2       Physical examination:  Gen: NAD, AAOx3, answering questions appropriately  HEENT: atraumatic, NG in place   CV: RR  Resp: NWOB  Abd: S/ND, moderately tender to RUQ and epigastric region, incisions clean, dry, intact. Drain in place  Ext: moving all extremities spontaneously and purposefully  Neuro: CN II-XII grossly intact  Skin/wounds: WWP, no wounds     Labs:  Renal:  Recent Labs     06/11/24  0529 06/12/24  0510 06/13/24  0446   BUN 9.5* 8.8* 5.9*   CREATININE 0.69 0.64 0.65     No results for input(s): "LACTIC" in the last 72 hours.  FENGI:  Recent Labs     06/11/24  0529 06/12/24  0510 06/13/24  0446    136 139   K 3.5 3.2* 3.7    106 107   CO2 22* 21* 26   CALCIUM 9.1 8.4 8.5   MG 1.90 1.80 2.20   PHOS 2.5 2.5 2.5   ALBUMIN 3.1* 2.6* 2.5*   BILITOT 0.7 0.5 0.5   AST 22 18 29   ALKPHOS 129 105 103   ALT 46 32 34     Heme:  Recent Labs     06/11/24  0529 06/12/24  0510 06/13/24 0446   HGB 10.7* 9.5* 9.4*   HCT 32.9* 30.1* 29.3*    228 248     ID:  Recent Labs     06/11/24  0529 06/12/24  0510 06/13/24  0446   WBC 10.10 8.08 5.35     CBG:  Recent Labs     06/11/24  0529 06/12/24  0510 06/13/24 0446   GLUCOSE 93 99 97    " "  Cardiovascular:  No results for input(s): "TROPONINI", "CKTOTAL", "CKMB", "BNP" in the last 168 hours.  I have reviewed all pertinent lab results within the past 24 hours.    Imaging:  FL ERCP Biliary And Pancreatic By [facility]   Final Result      See operative report for details.         Electronically signed by: Karol Ruiz   Date:    06/13/2024   Time:    07:54      CT Guided Abscess Drainage With Catheter   Final Result      Successful placement of a 10 Haitian drain into the right upper quadrant bilious collection.  Samples were collected and sent to the lab for analysis.         Electronically signed by: Ozzy Presley   Date:    06/11/2024   Time:    14:01      NM Hepatobiliary Scan (HIDA)   Final Result      No uptake of radiotracer along the gallbladder fossa to suggest a bile leak or biloma.         Electronically signed by: David Guzmán   Date:    06/10/2024   Time:    15:38      CT Previous   Final Result         I have reviewed all pertinent imaging results/findings within the past 24 hours.    Micro/Path/Other:  Microbiology Results (last 7 days)       Procedure Component Value Units Date/Time    Body Fluid Culture [[id number]] Collected: 06/11/24 1255    Order Status: Completed Specimen: Body Fluid from Gallbladder Updated: 06/13/24 0839     Body Fluid Culture No Growth At 48 Hours    Anaerobic Culture [[id number]] Collected: 06/11/24 1255    Order Status: Completed Specimen: Abscess from Gallbladder Updated: 06/13/24 0750     Anaerobe Culture No Anaerobes Isolated    Gram Stain [[id number]] Collected: 06/11/24 1255    Order Status: Completed Specimen: Abscess from Gallbladder Updated: 06/11/24 1438     GRAM STAIN No WBCs, No bacteria seen    Fungal Culture [[id number]] Collected: 06/11/24 1255    Order Status: Sent Specimen: Abscess from Gallbladder Updated: 06/11/24 1321           Pathology Results  (Last 7 days)      None             Assessment & Plan:   63 y.o. female with history of lap " rio on 6/1 with Dr. Hamm presents as transfer from OSH for abdominal pain. Found to have fluid in the gallbladder lori and along the lateral edge of the lvier with WBC elevated at 17. HIDA negative for bile leak. ERCP w/ GI this AM w/ sphincterotomy and CBD stone removal   Minimal output from LIDIA drain    - Regular diet   - PRN pain/nausea control   - Continue Zosyn  - Strict I/O for drain output   - Discharge today most likely with drain    Esme Reis MD   [employer] General Surgery PGY2

## 2024-06-13 NOTE — ANESTHESIA PREPROCEDURE EVALUATION
06/13/2024  Yana Rand is a 63 y.o., female.    Cmp nl  9.4 / 29 / 248k  Ecg nsr, nl ecg    Pre-op Assessment    I have reviewed the Patient Summary Reports.     I have reviewed the Nursing Notes. I have reviewed the NPO Status.   I have reviewed the Medications.     Review of Systems  Anesthesia Hx:  No problems with previous Anesthesia               Denies Personal Hx of Anesthesia complications.                    Social:  Non-Smoker       Cardiovascular:  Exercise tolerance: good                                           Pulmonary:  Pulmonary Normal                       Hepatic/GI:        Bile leak          Neurological:  Neurology Normal                                      Endocrine:  Endocrine Normal                Physical Exam  General: Well nourished, Cooperative and Alert    Airway:  Mallampati: II   Mouth Opening: Normal  TM Distance: Normal  Tongue: Normal    Dental:  Intact    Chest/Lungs:  Normal Respiratory Rate        Anesthesia Plan  Type of Anesthesia, risks & benefits discussed:    Anesthesia Type: Gen ETT  Intra-op Monitoring Plan: Standard ASA Monitors  Post Op Pain Control Plan: multimodal analgesia  Induction:  IV and rapid sequence  Informed Consent: Informed consent signed with the Patient and all parties understand the risks and agree with anesthesia plan.  All questions answered.   ASA Score: 2  Day of Surgery Review of History & Physical: H&P Update referred to the surgeon/provider.    Ready For Surgery From Anesthesia Perspective.     .

## 2024-06-13 NOTE — PROVATION PATIENT INSTRUCTIONS
Discharge Summary/Instructions after an Endoscopic Procedure  Patient Name: Yana Rand  Patient MRN: [id number]  Patient YOB: 1960 Thursday, June 13, 2024  Cleopatra Cullen III, MD  Dear patient,  As a result of recent federal legislation (The Federal Cures Act), you may   receive lab or pathology results from your procedure in your [facility]   account before your physician is able to contact you. Your physician or   their representative will relay the results to you with their   recommendations at their soonest availability.  Thank you,  RESTRICTIONS:  During your procedure today, you received medications for sedation.  These   medications may affect your judgment, balance and coordination.  Therefore,   for 24 hours, you have the following restrictions:   - DO NOT drive a car, operate machinery, make legal/financial decisions,   sign important papers or drink alcohol.    ACTIVITY:  Today: no heavy lifting, straining or running due to procedural   sedation/anesthesia.  The following day: return to full activity including work.  DIET:  Eat and drink normally unless instructed otherwise.     TREATMENT FOR COMMON SIDE EFFECTS:  - Mild abdominal pain, nausea, belching, bloating or excessive gas:  rest,   eat lightly and use a heating pad.  - Sore Throat: treat with throat lozenges and/or gargle with warm salt   water.  - Because air was used during the procedure, expelling large amounts of air   from your rectum or belching is normal.  - If a bowel prep was taken, you may not have a bowel movement for 1-3 days.    This is normal.  SYMPTOMS TO WATCH FOR AND REPORT TO YOUR PHYSICIAN:  1. Abdominal pain or bloating, other than gas cramps.  2. Chest pain.  3. Back pain.  4. Signs of infection such as: chills or fever occurring within 24 hours   after the procedure.  5. Rectal bleeding, which would show as bright red, maroon, or black stools.   (A tablespoon of blood from the rectum is not serious, especially  if   hemorrhoids are present.)  6. Vomiting.  7. Weakness or dizziness.  GO DIRECTLY TO THE NEAREST EMERGENCY ROOM IF YOU HAVE ANY OF THE FOLLOWING:      Difficulty breathing              Chills and/or fever over 101 F   Persistent vomiting and/or vomiting blood   Severe abdominal pain   Severe chest pain   Black, tarry stools   Bleeding- more than one tablespoon   Any other symptom or condition that you feel may need urgent attention  Your doctor recommends these additional instructions:  If any biopsies were taken, your doctors clinic will contact you in 1 to 2   weeks with any results.  - Observe patient's clinical course.   - Return patient to hospital [facility] for ongoing care.  For questions, problems or results please call your physician - Cleopatra Cullen III, MD at Work:  [phone].  [facility], EMERGENCY ROOM PHONE NUMBER: [phone]  IF A COMPLICATION OR EMERGENCY SITUATION ARISES AND YOU ARE UNABLE TO REACH   YOUR PHYSICIAN - GO DIRECTLY TO THE EMERGENCY ROOM.  Cleopatra Cullen III, MD  6/13/2024 7:47:55 AM  This report has been verified and signed electronically.  Dear patient,  As a result of recent federal legislation (The Federal Cures Act), you may   receive lab or pathology results from your procedure in your [facility]   account before your physician is able to contact you. Your physician or   their representative will relay the results to you with their   recommendations at their soonest availability.  Thank you,  PROVATION

## 2024-06-13 NOTE — TRANSFER OF CARE
"Anesthesia Transfer of Care Note    Patient: Yana Rand    Procedure(s) Performed: Procedure(s) (LRB):  ERCP (N/A)  ERCP, WITH CALCULUS REMOVAL  ERCP, WITH SPHINCTEROTOMY  EGD,WITH HEMORRHAGE CONTROL    Patient location: GI    Anesthesia Type: MAC    Transport from OR: Transported from OR on room air with adequate spontaneous ventilation    Post assessment: no apparent anesthetic complications    Post vital signs: stable    Level of consciousness: responds to stimulation    Nausea/Vomiting: no nausea/vomiting    Complications: none    Transfer of care protocol was followed      Last vitals: Visit Vitals  BP (!) 185/92 (BP Location: Right arm)   Pulse 107   Temp 36.7 °C (98.1 °F) (Skin)   Resp (!) 22   Ht 5' 5" (1.651 m)   Wt 49.9 kg (110 lb)   SpO2 100%   Breastfeeding No   BMI 18.30 kg/m²     "

## 2024-06-13 NOTE — DISCHARGE SUMMARY
[facility] - 8th Floor Med Surg  General Surgery  Discharge Summary      Patient Name: Yana Rand  MRN: [id number]  Admission Date: 6/8/2024  Hospital Length of Stay: 4 days  Discharge Date and Time:  06/13/2024 4:03 PM  Attending Physician: Abid Gallegos Jr., *   Discharging Provider: Debra Flores MD  Primary Care Provider: Niall Lambert MD     HPI: 63 year old female with history of lap rio on 6/1 with Dr. Hamm presents as transfer from OSH for abdominal pain. Patient reports she was having a normal postoperative course until today when she started experiencing abdominal pain. She denies associated symptoms such as fever, nausea, vomiting, decreased PO intake, diarrhea or constipation. Last BM was today. OSH got a CT AP which showed fluid in the gallbladder fossa and around the liver. WBC elevated at 17.     Procedure(s) (LRB):  ERCP (N/A)  ERCP, WITH CALCULUS REMOVAL  ERCP, WITH SPHINCTEROTOMY  EGD,WITH HEMORRHAGE CONTROL     Hospital Course: Yana Rand is a 63 y.o. female admitted on 6/8/2024. Patient was seen and examined in ED. Clinical presentation suggested fluid in gallbladder fossa. After risks, benefits and alternatives were discussed patient was scheduled for IR drainage on 6/11 at [facility]. IR drainage with possible bile. GI consulted and patient underwent ERCP with GI and had residual stone removed. Patient underwent the aforementioned procedure without complication. For further details please refer to the operative report. Over the ensuing hospital course patients clinical condition continued to improve and on 6/13/2024 all discharge criteria had been met and patient was deemed stable enough for discharge. Patient will follow up in clinic to have LIDIA drain removed.       Consults:   Consults (From admission, onward)          Status Ordering Provider     Inpatient consult to Gastroenterology  Once        Provider:  Сергей Turner MD    Completed THERESE  JOSH     Inpatient consult to Interventional Radiology  Once        Provider:  (Not yet assigned)    Completed JOSH SALEH            Significant Diagnostic Studies: Microbiology: no growth from drain culture     Pending Diagnostic Studies:       None          Final Active Diagnoses:    Diagnosis Date Noted POA    PRINCIPAL PROBLEM:  Cholelithiasis with chronic cholecystitis with biliary obstruction [K80.11] 06/02/2024 Yes      Problems Resolved During this Admission:      Discharged Condition: stable    Disposition: Home or Self Care    Follow Up:   Follow-up Information       Surgery, [facility] Acute Care Follow up.    Contact information:  [address] [place] [address] [phone]                           Patient Instructions:      Diet Adult Regular     Activity as tolerated     Medications:  Reconciled Home Medications:      Medication List        CHANGE how you take these medications      docusate sodium 100 MG capsule  Commonly known as: COLACE  Take 1 capsule (100 mg total) by mouth 2 (two) times daily as needed for Constipation.  What changed:   when to take this  reasons to take this     oxyCODONE-acetaminophen  mg per tablet  Commonly known as: PERCOCET  Take 1 tablet by mouth every 6 (six) hours as needed for Pain.  What changed: when to take this            CONTINUE taking these medications      ALPRAZolam 0.25 MG tablet  Commonly known as: XANAX  Take 0.25 mg by mouth 2 (two) times a day.     estrogens (conjugated) 0.625 MG tablet  Commonly known as: PREMARIN  Take 0.625 mg by mouth once daily.     lisinopriL 10 MG tablet  Take 1 tablet (10 mg total) by mouth once daily.     polyethylene glycol 17 gram/dose powder  Commonly known as: GLYCOLAX  Take 17 g by mouth as needed for Constipation.              Josh Saleh MD  General Surgery  [facility] - 8th Floor Med Surg

## 2024-06-13 NOTE — PROGRESS NOTES
"Gastroenterology Progress Note    Subjective/Interval History:  Pt says abdominal pain improved. Minimal output form LIDIA drain.  ERCP- CBD stone distal. ERS with removal. Injection of contrast revealed no extravasation. No stent placed.     ROS:  ROSAbd pain improved    Vital Signs:  BP (!) 157/84   Pulse 80   Temp 98.1 °F (36.7 °C) (Skin)   Resp 12   Ht 5' 5" (1.651 m)   Wt 49.9 kg (110 lb)   SpO2 98%   Breastfeeding No   BMI 18.30 kg/m²   Body mass index is 18.3 kg/m².    Physical Exam:  Physical Examheart RRR    Lungs clear   Abdomen soft nontender    Labs:  Recent Results (from the past 48 hour(s))   Gram Stain    Collection Time: 06/11/24 12:55 PM    Specimen: Gallbladder; Abscess   Result Value Ref Range    GRAM STAIN No WBCs, No bacteria seen    Body Fluid Culture    Collection Time: 06/11/24 12:55 PM    Specimen: Gallbladder; Body Fluid   Result Value Ref Range    Body Fluid Culture No Growth At 24 Hours    Anaerobic Culture    Collection Time: 06/11/24 12:55 PM    Specimen: Gallbladder; Abscess   Result Value Ref Range    Anaerobe Culture No Anaerobes Isolated    Comprehensive Metabolic Panel    Collection Time: 06/12/24  5:10 AM   Result Value Ref Range    Sodium 136 136 - 145 mmol/L    Potassium 3.2 (L) 3.5 - 5.1 mmol/L    Chloride 106 98 - 107 mmol/L    CO2 21 (L) 23 - 31 mmol/L    Glucose 99 82 - 115 mg/dL    Blood Urea Nitrogen 8.8 (L) 9.8 - 20.1 mg/dL    Creatinine 0.64 0.55 - 1.02 mg/dL    Calcium 8.4 8.4 - 10.2 mg/dL    Protein Total 5.4 (L) 5.8 - 7.6 gm/dL    Albumin 2.6 (L) 3.4 - 4.8 g/dL    Globulin 2.8 2.4 - 3.5 gm/dL    Albumin/Globulin Ratio 0.9 (L) 1.1 - 2.0 ratio    Bilirubin Total 0.5 <=1.5 mg/dL     40 - 150 unit/L    ALT 32 0 - 55 unit/L    AST 18 5 - 34 unit/L    eGFR >60 mL/min/1.73/m2    Anion Gap 9.0 mEq/L    BUN/Creatinine Ratio 14    Magnesium    Collection Time: 06/12/24  5:10 AM   Result Value Ref Range    Magnesium Level 1.80 1.60 - 2.60 mg/dL   Phosphorus    " Collection Time: 06/12/24  5:10 AM   Result Value Ref Range    Phosphorus Level 2.5 2.3 - 4.7 mg/dL   CBC with Differential    Collection Time: 06/12/24  5:10 AM   Result Value Ref Range    WBC 8.08 4.50 - 11.50 x10(3)/mcL    RBC 2.98 (L) 4.20 - 5.40 x10(6)/mcL    Hgb 9.5 (L) 12.0 - 16.0 g/dL    Hct 30.1 (L) 37.0 - 47.0 %    .0 (H) 80.0 - 94.0 fL    MCH 31.9 (H) 27.0 - 31.0 pg    MCHC 31.6 (L) 33.0 - 36.0 g/dL    RDW 11.8 11.5 - 17.0 %    Platelet 228 130 - 400 x10(3)/mcL    MPV 10.2 7.4 - 10.4 fL    Neut % 74.9 %    Lymph % 11.3 %    Mono % 12.4 %    Eos % 0.7 %    Basophil % 0.2 %    Lymph # 0.91 0.6 - 4.6 x10(3)/mcL    Neut # 6.05 2.1 - 9.2 x10(3)/mcL    Mono # 1.00 0.1 - 1.3 x10(3)/mcL    Eos # 0.06 0 - 0.9 x10(3)/mcL    Baso # 0.02 <=0.2 x10(3)/mcL    IG# 0.04 0 - 0.04 x10(3)/mcL    IG% 0.5 %    NRBC% 0.0 %   Comprehensive Metabolic Panel    Collection Time: 06/13/24  4:46 AM   Result Value Ref Range    Sodium 139 136 - 145 mmol/L    Potassium 3.7 3.5 - 5.1 mmol/L    Chloride 107 98 - 107 mmol/L    CO2 26 23 - 31 mmol/L    Glucose 97 82 - 115 mg/dL    Blood Urea Nitrogen 5.9 (L) 9.8 - 20.1 mg/dL    Creatinine 0.65 0.55 - 1.02 mg/dL    Calcium 8.5 8.4 - 10.2 mg/dL    Protein Total 5.2 (L) 5.8 - 7.6 gm/dL    Albumin 2.5 (L) 3.4 - 4.8 g/dL    Globulin 2.7 2.4 - 3.5 gm/dL    Albumin/Globulin Ratio 0.9 (L) 1.1 - 2.0 ratio    Bilirubin Total 0.5 <=1.5 mg/dL     40 - 150 unit/L    ALT 34 0 - 55 unit/L    AST 29 5 - 34 unit/L    eGFR >60 mL/min/1.73/m2    Anion Gap 6.0 mEq/L    BUN/Creatinine Ratio 9    Magnesium    Collection Time: 06/13/24  4:46 AM   Result Value Ref Range    Magnesium Level 2.20 1.60 - 2.60 mg/dL   Phosphorus    Collection Time: 06/13/24  4:46 AM   Result Value Ref Range    Phosphorus Level 2.5 2.3 - 4.7 mg/dL   CBC with Differential    Collection Time: 06/13/24  4:46 AM   Result Value Ref Range    WBC 5.35 4.50 - 11.50 x10(3)/mcL    RBC 2.92 (L) 4.20 - 5.40 x10(6)/mcL    Hgb 9.4 (L)  12.0 - 16.0 g/dL    Hct 29.3 (L) 37.0 - 47.0 %    .3 (H) 80.0 - 94.0 fL    MCH 32.2 (H) 27.0 - 31.0 pg    MCHC 32.1 (L) 33.0 - 36.0 g/dL    RDW 11.7 11.5 - 17.0 %    Platelet 248 130 - 400 x10(3)/mcL    MPV 11.0 (H) 7.4 - 10.4 fL    Neut % 69.5 %    Lymph % 16.1 %    Mono % 11.6 %    Eos % 1.7 %    Basophil % 0.4 %    Lymph # 0.86 0.6 - 4.6 x10(3)/mcL    Neut # 3.72 2.1 - 9.2 x10(3)/mcL    Mono # 0.62 0.1 - 1.3 x10(3)/mcL    Eos # 0.09 0 - 0.9 x10(3)/mcL    Baso # 0.02 <=0.2 x10(3)/mcL    IG# 0.04 0 - 0.04 x10(3)/mcL    IG% 0.7 %    NRBC% 0.0 %       Imaging:  FL ERCP Biliary And Pancreatic By Rad Tech    Result Date: 6/13/2024  EXAMINATION: FL ERCP BILIARY AND PANCREATIC CLINICAL HISTORY: Bile leak; TECHNIQUE: Intra-operative fluoroscopy provided during ERCP.  Spot fluoroscopic images were uploaded into PACS.  Radiologist not present for exam.  Report for radiation documentation. FLUOROSCOPY TIME: 2 minutes 5 seconds FLUOROSCOPY Reference air Kerma: 23.7 mGy COMPARISON: none FINDINGS: ERCP images demonstrate cannulation of the common bile duct and contrast injection.     See operative report for details. Electronically signed by: Karol Ruiz Date:    06/13/2024 Time:    07:54    CT Guided Abscess Drainage With Catheter    Result Date: 6/11/2024  EXAMINATION: CT GUIDED ABSCESS DRAINAGE WITH CATHETER CLINICAL HISTORY: intra abdominal fluid collection; TECHNIQUE: CT guided drainage of a right upper quadrant collection. DLP: 267 COMPARISON: No relevant prior available for comparison. FINDINGS: The risks, benefits, and potential complications were discussed including bleeding, infection, end organ damage,.  Informed consent was obtained. The patient was positioned in the supine position. The right upper quadrant collection was localized with CT.  The skin was marked and the area was prepped and draped in a sterile fashion. The site was anesthetized with 1% lidocaine solution. A 19 gauge, 5 cm coaxial needle  was advanced into the collection, and a guidewire was then placed through the needle. The track was then dilated and a 10 Tongan drain was placed. The drain was sutured to the skin and attached to a LIDIA bulb. There was 70 cc of bilious fluid drained. The patient tolerated the procedure without difficulty. Post-procedure imaging demonstrated a well-positioned catheter.  There were no immediate post-procedure complications. The patient was discharged to her room in stable condition.     Successful placement of a 10 Tongan drain into the right upper quadrant bilious collection.  Samples were collected and sent to the lab for analysis. Electronically signed by: Ozzy Presley Date:    06/11/2024 Time:    14:01    NM Hepatobiliary Scan (HIDA)    Result Date: 6/10/2024  EXAMINATION: NM HEPATOBILIARY IMAGING INC GB (HIDA) CLINICAL HISTORY: Possible biloma; COMPARISON: CT 8 June 2024 FINDINGS: Radiopharmaceutical 8.8 mCi non-HEU Tc99m Choletec IV. There is prompt uptake of radiotracer by the liver.  No uptake of radiotracer along the gallbladder fossa is seen.  There is biliary and small bowel activity.  There is adequate clearance of activity from the liver.     No uptake of radiotracer along the gallbladder fossa to suggest a bile leak or biloma. Electronically signed by: David Guzmán Date:    06/10/2024 Time:    15:38    SURG FL Surgery Fluoro Usage    Result Date: 6/1/2024  See OP Notes for results. IMPRESSION: See OP Notes for results. This procedure was auto-finalized by: Virtual Radiologist    US Abdomen Limited_Liver    Result Date: 5/29/2024  EXAMINATION: US ABDOMEN LIMITED_LIVER CLINICAL HISTORY: RUQ pain; COMPARISON: CT earlier today. FINDINGS: Grayscale, color and spectral doppler evaluation of the right upper quadrant. No focal abnormality of limited visualized pancreas. Imaged portion of the IVC is normal in caliber. Liver is not significantly enlarged. No focal liver lesion. Normal hepatopetal flow is noted in  the portal vein. There are gallstones.  The gallbladder is distended and there is trace pericholecystic fluid.  Sonographic Rose sign was negative.  The common bile duct is mildly dilated measuring up to 8 mm. Right kidney measures 9 cm in length. No hydronephrosis.     1. Cholelithiasis with distended gallbladder but negative sonographic Rose sign.  If there remains clinical suspicion of acute cholecystitis would recommend correlation with nuclear medicine HIDA scan. 2. Mild extrahepatic biliary ductal dilatation. Electronically signed by: David Guzmán Date:    05/29/2024 Time:    16:22    CT Abdomen Pelvis With IV Contrast NO Oral Contrast    Result Date: 5/29/2024  EXAMINATION: CT ABDOMEN PELVIS WITH IV CONTRAST CLINICAL HISTORY: transaminitis; TECHNIQUE: CT imaging was performed of the abdomen and pelvis after the administration of intravenous contrast. Dose length product is 188 mGycm. Automatic exposure control, adjustment of mA/kV or iterative reconstruction technique was used to limit radiation dose. COMPARISON: None FINDINGS: Liver: There are multiple scattered small hepatic hypodensities measuring up to 7 mm in size. Gallbladder and biliary tree: Gallbladder is distended with numerous calcified gallstones.  No intra or extrahepatic biliary ductal dilation. Pancreas: Normal. Spleen: Normal. Adrenals: Normal. Kidneys and ureters: Bilateral renal cysts.  No hydronephrosis. Bladder: Normal. Reproductive organs: The uterus has been surgically resected.  A 2 cm right-sided Bartholin's gland cyst is noted. Stomach/bowel: No evidence of bowel obstruction. Appendix is normal. No discernible bowel inflammation. Lymph nodes: No pathologically enlarged lymph node identified. Peritoneum: No ascites or free air. No fluid collection. Vessels: Mild scattered vascular calcifications. Abdominal wall: Normal. Lung bases: No consolidation or pleural effusion. Bones: No acute osseous findings.     Distended gallbladder  with numerous calcified gallstones.  Right upper quadrant ultrasound is available for correlation. Electronically signed by: Krista Pal Date:    05/29/2024 Time:    13:47         Assessment/Plan:  CBD stone removed. Bile leak has resolved.        Cleopatra Cullen Iii, MD

## 2024-06-13 NOTE — NURSING
Patient and family given dc instructions and LIDIA drain education at this time. Pt iv taken out. Patient leaving with family.

## 2024-06-14 ENCOUNTER — NURSE TRIAGE (OUTPATIENT)
Dept: ADMINISTRATIVE | Facility: CLINIC | Age: 64
End: 2024-06-14
Payer: COMMERCIAL

## 2024-06-14 ENCOUNTER — TELEPHONE (OUTPATIENT)
Dept: SURGERY | Facility: CLINIC | Age: 64
End: 2024-06-14
Payer: COMMERCIAL

## 2024-06-14 LAB — BACTERIA SPEC ANAEROBE CULT: NORMAL

## 2024-06-14 NOTE — TELEPHONE ENCOUNTER
----- Message from Ashly Harrison RN sent at 6/14/2024  8:40 AM CDT -----  Pt needs appt for Tuesday.Lap Melody with IR drain  ----- Message -----  From: Debra Flores MD  Sent: 6/13/2024   4:18 PM CDT  To: Ashly Harrison RN    Please have patient follow up this Tuesday to remove LIDIA drain, thanks!

## 2024-06-14 NOTE — TELEPHONE ENCOUNTER
OOC RN  patient's  calling for patient.Dr. Hamm  s/p 6/13/24  Gallbladder removal done and now has a drain for gallbladder.  Today is PD 1  Starting walking today and noticed bilateral leg swelling to hip.  Has fu on the 6/18/24 with Dr. Hamm.   No swelling yesterday.     Care advise   go to UC/ED see in office today.    Reason for Disposition   MODERATE swelling of both ankles (e.g., swelling extends up to the knees) AND new-onset or worsening    Additional Information   Negative: Sounds like a life-threatening emergency to the triager   Negative: Difficulty breathing at rest   Negative: Entire foot is cool or blue in comparison to other side   Negative: SEVERE swelling (e.g., swelling extends above knee, entire leg is swollen, weeping fluid)   Negative: Cast on leg or ankle and has increasing pain   Negative: Can't walk or can barely stand (new-onset)   Negative: Fever and red area (or area very tender to touch)   Negative: Patient sounds very sick or weak to the triager   Negative: Swelling of face, arm or hands  (Exception: Slight puffiness of fingers during hot weather.)   Negative: Pregnant 20 or more weeks and sudden weight gain (i.e., > 2 lbs, 1 kg in one week)   Negative: Thigh or calf pain and only 1 side and present > 1 hour   Negative: Thigh, calf, or ankle swelling in only one leg   Negative: Thigh, calf, or ankle swelling in both legs, but one side is definitely more swollen (Exception: Longstanding difference between legs.)    Protocols used: Leg Swelling and Edema-A-[place]

## 2024-06-16 LAB — BACTERIA FLD CULT: NORMAL

## 2024-06-18 ENCOUNTER — OFFICE VISIT (OUTPATIENT)
Dept: SURGERY | Facility: CLINIC | Age: 64
End: 2024-06-18
Payer: COMMERCIAL

## 2024-06-18 DIAGNOSIS — K80.65 CALCULUS OF GALLBLADDER AND BILE DUCT WITH CHRONIC CHOLECYSTITIS WITH OBSTRUCTION: Primary | ICD-10-CM

## 2024-06-18 PROCEDURE — 99024 POSTOP FOLLOW-UP VISIT: CPT | Mod: ,,, | Performed by: SURGERY

## 2024-06-18 PROCEDURE — 4010F ACE/ARB THERAPY RXD/TAKEN: CPT | Mod: CPTII,,, | Performed by: SURGERY

## 2024-06-18 NOTE — PROGRESS NOTES
[facility] ACUTE CARE SURGERY   Clinic Note       HPI: Yana Rand is a 63 y.o. female s/p laparoscopic cholecystectomy with IOC on 6/1. She was subsequently admitted on 6/8/2024. Clinical presentation suggested fluid in gallbladder fossa. IR drainage on 6/11 which suggested possible bile leak. HIDA scan performed was negative for bile leak. GI then consulted and patient underwent ERCP with GI and had residual stone removed. She returns to clinic today for follow up of her IR drain. Patient reports less than 5 cc output per day. However, the output does appear to be bile. Patient denies any further abdominal pain, PO intolerance, nausea, vomiting, or changes in voiding or bowel movements.     ROS negative other than those noted in the HPI above.       Physical Exam:   There were no vitals filed for this visit.   Gen: NAD, AAOx3   Eye: EOMI   CV: RR  Pulm: NWOB on RA  Abd: soft, non-tender, non-distended, incisions clean, dry, intact, IR drain to RUQ with green bilious appearing output   : Deferred  Ext:  Move all 4 extremities.       Interval Pathology:    Gangrenous cholecystitis        Assessment/Plan: Yana Rand is a 63 y.o. female s/p laparoscopic cholecystectomy with IOC on 6/1. She was subsequently admitted on 6/8/2024. Clinical presentation suggested fluid in gallbladder fossa. IR drainage on 6/11 which suggested possible bile leak. HIDA scan performed was negative for bile leak. GI then consulted and patient underwent ERCP with GI and had residual stone removed. She returns to clinic today for follow up of her IR drain.       - Recommend leave IR drain in for 1 more week to be sure that patient does not have an increase in her output. Suspect residual bile from surgery. Discussed with patient the possible reasons for bilious drain output.   - Follow up in clinic next week to pull drain as long as output remains minimal     Debra Flores MD  [employer] General Surgery, PGY-1

## 2024-06-19 NOTE — PROGRESS NOTES
I have reviewed the notes, assessments, and/or procedures performed this visit, and I concur with the documentation.    Lap rio with postop fluid collection initially felt to be bilious.  HIDA scan and ERCP did NOT show leak.  Residual stone swept out of duct.  Trace bilious appearing fluid in drain.  Patient clinically doing well.  Drain flushed.  Will leave in for one more week to ensure drainage clears up/

## 2024-06-25 ENCOUNTER — OFFICE VISIT (OUTPATIENT)
Dept: SURGERY | Facility: CLINIC | Age: 64
End: 2024-06-25
Payer: COMMERCIAL

## 2024-06-25 DIAGNOSIS — K80.65 CALCULUS OF GALLBLADDER AND BILE DUCT WITH CHRONIC CHOLECYSTITIS WITH OBSTRUCTION: Primary | ICD-10-CM

## 2024-06-25 PROCEDURE — 99024 POSTOP FOLLOW-UP VISIT: CPT | Mod: ,,, | Performed by: SURGERY

## 2024-06-25 PROCEDURE — 4010F ACE/ARB THERAPY RXD/TAKEN: CPT | Mod: CPTII,,, | Performed by: SURGERY

## 2024-06-25 NOTE — PROGRESS NOTES
I have reviewed the notes, assessments, and/or procedures performed this visit, and I concur with the documentation.    No further drainage  drain removed.  F/u prn

## 2024-06-25 NOTE — PROGRESS NOTES
[facility] ACUTE CARE SURGERY   Clinic Note       HPI: Yana Rand is a 63 y.o. female s/p laparoscopic cholecystectomy with IOC on 6/1. She was subsequently admitted on 6/8/2024. Clinical presentation suggested fluid in gallbladder fossa. IR drainage on 6/11 which suggested possible bile leak. HIDA scan performed was negative for bile leak. GI then consulted and patient underwent ERCP with GI and had residual stone removed. She returns to clinic today for follow up of her IR drain. Patient reports less than 5 cc output per day.  Patient denies any further abdominal pain, PO intolerance, nausea, vomiting, or changes in voiding or bowel movements.      ROS negative other than those noted in the HPI above.       Physical Exam:   There were no vitals filed for this visit.   Gen: NAD, AAOx3   Eye: EOMI   CV: RR  Pulm: NWOB on RA  Abd: soft, non-tender, non-distended, incisions clean, dry, intact, IR drain to RUQ with green bilious appearing output   : Deferred  Ext:  Move all 4 extremities.       Interval Pathology:    Gangrenous cholecystitis        Assessment/Plan: Yana Rand is a 63 y.o. female s/p laparoscopic cholecystectomy with IOC on 6/1. She was subsequently admitted on 6/8/2024. Clinical presentation suggested fluid in gallbladder fossa. IR drainage on 6/11 which suggested possible bile leak. HIDA scan performed was negative for bile leak. GI then consulted and patient underwent ERCP with GI and had residual stone removed. She returns to clinic today for follow up of her IR drain.       - Patient without any further output, drain removed in clinic today   - Follow up in clinic PRN  - Return precautions given to patient       Debra Flores MD  [employer] General Surgery, PGY-1

## (undated) DEVICE — SLEEVE ECLIPSE GOWN STRL 23IN

## (undated) DEVICE — BLADE SURG STAINLESS STEEL #11

## (undated) DEVICE — EXTRACTOR PRO 9-12MM ABOVE

## (undated) DEVICE — APPLICATOR STRL COT 2INNR 6IN

## (undated) DEVICE — CANNULA ENDOPATH XCEL 5X100MM

## (undated) DEVICE — KIT SURGICAL COLON .25 1.1OZ

## (undated) DEVICE — KIT SURGIFLO HEMOSTATIC MATRIX

## (undated) DEVICE — SUT VICRYL PLUS 4-0 FS-2 27IN

## (undated) DEVICE — IRRIGATOR SUCTION W/TIP

## (undated) DEVICE — DRAPE SLUSH WARMER 66X44IN

## (undated) DEVICE — CORD LAP 10 DISP

## (undated) DEVICE — SYR 30CC LUER LOCK

## (undated) DEVICE — TROCAR ENDOPATH XCEL 5X100MM

## (undated) DEVICE — STRIP MEDI WND CLSR 1/2X4IN

## (undated) DEVICE — ELECTRODE REM PLYHSV RETURN 9

## (undated) DEVICE — APPLICATOR SURGICEL ENDOSCOPIC

## (undated) DEVICE — BAG SPEC RETRV ENDO 4X6IN DISP

## (undated) DEVICE — TROCAR ENDOPATH XCEL 11MM 10CM

## (undated) DEVICE — SUPPORT ULNA NERVE PROTECTOR

## (undated) DEVICE — SOL IRRI STRL WATER 1000ML

## (undated) DEVICE — TIP SUCTION YANKAUER

## (undated) DEVICE — SYR 10CC LUER LOCK

## (undated) DEVICE — STOPCOCK 4-WAY

## (undated) DEVICE — GLOVE PROTEXIS HYDROGEL SZ7.5

## (undated) DEVICE — Device

## (undated) DEVICE — APPLICATOR ENDOSCOPIC

## (undated) DEVICE — KIT SURGICAL TURNOVER

## (undated) DEVICE — GLOVE PROTEXIS BLUE LATEX 7.5

## (undated) DEVICE — DEVICE LOCKING RX - OLYMPUS

## (undated) DEVICE — APPLIER CLIP ENDO MED/LG 10MM

## (undated) DEVICE — NDL SAFETY 22G X 1.5 ECLIPSE

## (undated) DEVICE — ADHESIVE MASTISOL VIAL 48/BX

## (undated) DEVICE — SCISSOR CURVED ENDOPATH 5MM

## (undated) DEVICE — COLLECTION SPECIMEN NEPTUNE

## (undated) DEVICE — NDL INSUF ULTRA VERESS 120MM

## (undated) DEVICE — HEMOSTAT SURGICEL PWD 3G

## (undated) DEVICE — ELECTRODE PATIENT RETURN DISP

## (undated) DEVICE — KIT GEN LAPAROSCOPY LAFAYETTE

## (undated) DEVICE — DISSECTOR 5MM ENDOPATH

## (undated) DEVICE — JAGTOME RX 44 20X5 .035X260CM

## (undated) DEVICE — BLOCK BLOX BITE DENT RIM 54FR

## (undated) DEVICE — SUT VICRYL+ 27 UR-6 VIOL